# Patient Record
Sex: FEMALE | Race: WHITE | NOT HISPANIC OR LATINO | Employment: FULL TIME | URBAN - METROPOLITAN AREA
[De-identification: names, ages, dates, MRNs, and addresses within clinical notes are randomized per-mention and may not be internally consistent; named-entity substitution may affect disease eponyms.]

---

## 2017-07-31 ENCOUNTER — APPOINTMENT (OUTPATIENT)
Dept: URGENT CARE | Facility: MEDICAL CENTER | Age: 22
End: 2017-07-31

## 2017-07-31 ENCOUNTER — APPOINTMENT (OUTPATIENT)
Dept: LAB | Facility: MEDICAL CENTER | Age: 22
End: 2017-07-31

## 2017-07-31 ENCOUNTER — TRANSCRIBE ORDERS (OUTPATIENT)
Dept: URGENT CARE | Facility: MEDICAL CENTER | Age: 22
End: 2017-07-31

## 2017-07-31 DIAGNOSIS — Z00.8 SPECIAL EXAM: ICD-10-CM

## 2017-07-31 DIAGNOSIS — Z00.8 SPECIAL EXAM: Primary | ICD-10-CM

## 2017-07-31 PROCEDURE — 86480 TB TEST CELL IMMUN MEASURE: CPT

## 2017-07-31 PROCEDURE — 36415 COLL VENOUS BLD VENIPUNCTURE: CPT

## 2017-08-02 LAB
ANNOTATION COMMENT IMP: NORMAL
GAMMA INTERFERON BACKGROUND BLD IA-ACNC: 0 IU/ML
M TB IFN-G BLD-IMP: NEGATIVE
M TB IFN-G CD4+ BCKGRND COR BLD-ACNC: 0.03 IU/ML
M TB IFN-G CD4+ T-CELLS BLD-ACNC: 0.03 IU/ML
MITOGEN IGNF BLD-ACNC: >10 IU/ML
QUANTIFERON-TB GOLD IN TUBE: NORMAL
SERVICE CMNT-IMP: NORMAL

## 2017-08-15 DIAGNOSIS — Z13.220 ENCOUNTER FOR SCREENING FOR LIPOID DISORDERS: ICD-10-CM

## 2017-08-15 DIAGNOSIS — Z13.1 ENCOUNTER FOR SCREENING FOR DIABETES MELLITUS: ICD-10-CM

## 2017-08-28 ENCOUNTER — TRANSCRIBE ORDERS (OUTPATIENT)
Dept: LAB | Facility: CLINIC | Age: 22
End: 2017-08-28

## 2017-08-28 ENCOUNTER — APPOINTMENT (OUTPATIENT)
Dept: LAB | Facility: CLINIC | Age: 22
End: 2017-08-28
Payer: COMMERCIAL

## 2017-08-28 DIAGNOSIS — Z13.1 ENCOUNTER FOR SCREENING FOR DIABETES MELLITUS: ICD-10-CM

## 2017-08-28 DIAGNOSIS — Z13.220 ENCOUNTER FOR SCREENING FOR LIPOID DISORDERS: ICD-10-CM

## 2017-08-28 LAB
CHOLEST SERPL-MCNC: 132 MG/DL (ref 50–200)
EST. AVERAGE GLUCOSE BLD GHB EST-MCNC: 100 MG/DL
HBA1C MFR BLD: 5.1 % (ref 4.2–6.3)
HDLC SERPL-MCNC: 53 MG/DL (ref 40–60)
LDLC SERPL CALC-MCNC: 70 MG/DL (ref 0–100)
TRIGL SERPL-MCNC: 44 MG/DL

## 2017-08-28 PROCEDURE — 83036 HEMOGLOBIN GLYCOSYLATED A1C: CPT

## 2017-08-28 PROCEDURE — 36415 COLL VENOUS BLD VENIPUNCTURE: CPT

## 2017-08-28 PROCEDURE — 80061 LIPID PANEL: CPT

## 2017-10-27 ENCOUNTER — ALLSCRIPTS OFFICE VISIT (OUTPATIENT)
Dept: OTHER | Facility: OTHER | Age: 22
End: 2017-10-27

## 2017-10-27 DIAGNOSIS — E55.9 VITAMIN D DEFICIENCY: ICD-10-CM

## 2017-12-13 ENCOUNTER — TRANSCRIBE ORDERS (OUTPATIENT)
Dept: LAB | Facility: CLINIC | Age: 22
End: 2017-12-13

## 2017-12-13 ENCOUNTER — APPOINTMENT (OUTPATIENT)
Dept: LAB | Facility: CLINIC | Age: 22
End: 2017-12-13
Payer: COMMERCIAL

## 2017-12-13 DIAGNOSIS — E55.9 VITAMIN D DEFICIENCY: ICD-10-CM

## 2017-12-13 LAB — 25(OH)D3 SERPL-MCNC: 14.8 NG/ML (ref 30–100)

## 2017-12-13 PROCEDURE — 36415 COLL VENOUS BLD VENIPUNCTURE: CPT

## 2017-12-13 PROCEDURE — 82306 VITAMIN D 25 HYDROXY: CPT

## 2017-12-14 ENCOUNTER — GENERIC CONVERSION - ENCOUNTER (OUTPATIENT)
Dept: OTHER | Facility: OTHER | Age: 22
End: 2017-12-14

## 2018-01-18 NOTE — PROGRESS NOTES
Assessment    1  Encounter for preventive health examination (V70 0) (Z00 00)   2  Vitamin D deficiency (268 9) (E55 9)    Plan  Anxiety    · Propranolol HCl - 10 MG Oral Tablet; TAKE 1 TABLET Daily PRN social/perform  anxiety  Vitamin D deficiency    · (1) VITAMIN D 25-HYDROXY; Status:Active; Requested [facility]:[id number];     Discussion/Summary  health maintenance visit healthy adult female Currently, she eats an adequate diet and has an adequate exercise regimen  the risks and benefits of cervical cancer screening were discussed cervical cancer screening is current Breast cancer screening: breast cancer screening is not indicated  Colorectal cancer screening: colorectal cancer screening is not indicated  Osteoporosis screening: bone mineral density testing is not indicated  The immunizations are up to date  She was advised to be evaluated by an optometrist and a dentist  Advice and education were given regarding nutrition, aerobic exercise, weight bearing exercise, weight loss, calcium supplements, vitamin D supplements, reproductive health, sunscreen use, self skin examination and seat belt use  Patient discussion: discussed with the patient  The patient was counseled regarding diagnostic results, instructions for management, risk factor reductions, prognosis, patient and family education, impressions, risks and benefits of treatment options, importance of compliance with treatment  Possible side effects of new medications were reviewed with the patient/guardian today  The treatment plan was reviewed with the patient/guardian  The patient/guardian understands and agrees with the treatment plan      Chief Complaint  NPA here for annual physical exam      History of Present Illness  HM, Adult Female: The patient is being seen for a health maintenance evaluation  The last health maintenance visit was year(s) ago  Social History: Household members include mother and father  She is unmarried   Work status: working full time and occupation: PA  The patient has never smoked cigarettes  She reports rare alcohol use and drinking 1-2 drinks per week  She has never used illicit drugs  General Health: The patient's health since the last visit is described as good  She has regular dental visits  She denies vision problems  Vision care includes having regular eye examinations  Lifestyle:  She consumes a diverse and healthy diet  She exercises regularly  She exercises 3 or more times per week for 30 or more minutes per session  Exercise includes aerobic conditioning and strength training  Reproductive health: the patient is premenopausal   she is sexually active  GYN -- Dr Osorio Has  Screening: cancer screening reviewed and current  Cervical cancer screening includes a pap smear performed last year  Breast cancer screening includes no previous mammogram  She hasn't been previously screened for colorectal cancer  metabolic screening reviewed and current  risk screening reviewed and current  Review of Systems    Constitutional: No fever, no chills, feels well, no tiredness, no recent weight gain or weight loss  Eyes: No complaints of eye pain, no red eyes, no eyesight problems, no discharge, no dry eyes, no itching of eyes  ENT: no complaints of earache, no loss of hearing, no nose bleeds, no nasal discharge, no sore throat, no hoarseness  Cardiovascular: No complaints of slow heart rate, no fast heart rate, no chest pain, no palpitations, no leg claudication, no lower extremity edema  Respiratory: No complaints of shortness of breath, no wheezing, no cough, no SOB on exertion, no orthopnea, no PND  Gastrointestinal: No complaints of abdominal pain, no constipation, no nausea or vomiting, no diarrhea, no bloody stools  Genitourinary: No complaints of dysuria, no incontinence, no pelvic pain, no dysmenorrhea, no vaginal discharge or bleeding     Musculoskeletal: No complaints of arthralgias, no myalgias, no joint swelling or stiffness, no limb pain or swelling  Integumentary: No complaints of skin rash or lesions, no itching, no skin wounds, no breast pain or lump  Neurological: No complaints of headache, no confusion, no convulsions, no numbness, no dizziness or fainting, no tingling, no limb weakness, no difficulty walking  Psychiatric: Not suicidal, no sleep disturbance, no anxiety or depression, no change in personality, no emotional problems  Endocrine: No complaints of proptosis, no hot flashes, no muscle weakness, no deepening of the voice, no feelings of weakness  Hematologic/Lymphatic: No complaints of swollen glands, no swollen glands in the neck, does not bleed easily, does not bruise easily  Over the past 2 weeks, how often have you been bothered by the following problems? 1 ) Little interest or pleasure in doing things? Not at all    2 ) Feeling down, depressed or hopeless? Not at all  ROS reviewed  Active Problems    1  Anxiety (300 00) (F41 9)   2  Encounter for screening examination for impaired glucose regulation and diabetes   mellitus (V77 1) (Z13 1)   3  Palpitations (785 1) (R00 2)   4  Screening, lipid (V77 91) (Z13 220)    Surgical History    · History of Oral Surgery Tooth Extraction Lena Tooth    Family History  Family History    · Family history of colon cancer (V16 0) (Z80 0)   · Family history of coronary artery disease (V17 3) (Z82 49)   · Family history of hypertension (V17 49) (Z82 49)   · Family history of hypothyroidism (V18 19) (Z83 49)    Social History    · Never a smoker   · No illicit drug use   · Social alcohol use (Z78 9)    Current Meds   1  Multi-Day Vitamins TABS; Therapy: (Recorded:[id number]) to Recorded    Allergies    1  No Known Drug Allergies    2   Bee sting    Vitals   Recorded: [id number] 02:02PM   Temperature 97 9 F, Tympanic   Heart Rate 89   Systolic 257   Diastolic 72   Height 5 ft 5 in   Weight 179 lb 7 oz   BMI Calculated 29 86   BSA Calculated 1 89   O2 Saturation 98     Physical Exam    Constitutional   General appearance: No acute distress, well appearing and well nourished  Head and Face   Head and face: Normal     Palpation of the face and sinuses: No sinus tenderness  Eyes   Conjunctiva and lids: No swelling, erythema or discharge  Pupils and irises: Equal, round, reactive to light  Ears, Nose, Mouth, and Throat   External inspection of ears and nose: Normal     Otoscopic examination: Tympanic membranes translucent with normal light reflex  Canals patent without erythema  Hearing: Normal     Nasal mucosa, septum, and turbinates: Normal without edema or erythema  Lips, teeth, and gums: Normal, good dentition  Oropharynx: Normal with no erythema, edema, exudate or lesions  Neck   Neck: Supple, symmetric, trachea midline, no masses  Thyroid: Normal, no thyromegaly  Pulmonary   Respiratory effort: No increased work of breathing or signs of respiratory distress  Auscultation of lungs: Clear to auscultation  Cardiovascular   Auscultation of heart: Normal rate and rhythm, normal S1 and S2, no murmurs  Examination of extremities for edema and/or varicosities: Normal     Chest   Chest: Normal     Abdomen   Abdomen: Non-tender, no masses  Liver and spleen: No hepatomegaly or splenomegaly  Lymphatic   Palpation of lymph nodes in neck: No lymphadenopathy  Musculoskeletal   Gait and station: Normal     Digits and nails: Normal without clubbing or cyanosis  Joints, bones, and muscles: Normal     Range of motion: Normal     Stability: Normal     Muscle strength/tone: Normal     Skin   Skin and subcutaneous tissue: Normal without rashes or lesions  Palpation of skin and subcutaneous tissue: Normal turgor  Neurologic   Cranial nerves: Cranial nerves II-XII intact  Reflexes: 2+ and symmetric  Sensation: No sensory loss      Psychiatric   Orientation to person, place, and time: Normal     Mood and affect: Normal        Signatures   Electronically signed by : Willian Avila DO; Oct 27 2017  2:37PM EST                       (Author)

## 2018-01-22 VITALS
OXYGEN SATURATION: 98 % | SYSTOLIC BLOOD PRESSURE: 128 MMHG | BODY MASS INDEX: 29.9 KG/M2 | HEART RATE: 89 BPM | DIASTOLIC BLOOD PRESSURE: 72 MMHG | TEMPERATURE: 97.9 F | WEIGHT: 179.44 LBS | HEIGHT: 65 IN

## 2018-01-23 NOTE — RESULT NOTES
Discussion/Summary   vitamin D level very low -- sending rx to pharmacy (weekly x 12 wks) -- recheck in 4 months     Verified Results  (1) VITAMIN D 25-HYDROXY [id number] 07:58AM Lynda Stoddard Order Number: [id number]     Test Name Result Flag Reference   VIT D 25-HYDROX 14 8 ng/mL L 30 0-100 0   This assay is a certified procedure of the CDC Vitamin D Standardization Certification Program (VDSCP)     Deficiency <20ng/ml   Insufficiency 20-30ng/ml   Sufficient  ng/ml     *Patients undergoing fluorescein dye angiography may retain small amounts of fluorescein in the body for 48-72 hours post procedure  Samples containing fluorescein can produce falsely elevated Vitamin D values  If the patient had this procedure, a specimen should be resubmitted post fluorescein clearance         Plan  Vitamin D deficiency    · Vitamin D (Ergocalciferol) 88265 UNIT Oral Capsule; TAKE 1 CAPSULE WEEKLY

## 2018-02-23 ENCOUNTER — OFFICE VISIT (OUTPATIENT)
Dept: FAMILY MEDICINE CLINIC | Facility: OTHER | Age: 23
End: 2018-02-23
Payer: COMMERCIAL

## 2018-02-23 VITALS
DIASTOLIC BLOOD PRESSURE: 80 MMHG | SYSTOLIC BLOOD PRESSURE: 112 MMHG | HEIGHT: 66 IN | OXYGEN SATURATION: 98 % | WEIGHT: 181 LBS | HEART RATE: 81 BPM | BODY MASS INDEX: 29.09 KG/M2 | TEMPERATURE: 99.3 F

## 2018-02-23 DIAGNOSIS — E55.9 VITAMIN D DEFICIENCY: ICD-10-CM

## 2018-02-23 DIAGNOSIS — F41.9 ANXIETY: Primary | ICD-10-CM

## 2018-02-23 PROBLEM — R00.2 PALPITATIONS: Status: ACTIVE | Noted: 2017-10-27

## 2018-02-23 PROCEDURE — 99214 OFFICE O/P EST MOD 30 MIN: CPT | Performed by: FAMILY MEDICINE

## 2018-02-23 RX ORDER — MULTIVITAMIN
TABLET ORAL
COMMUNITY
End: 2022-02-07 | Stop reason: ALTCHOICE

## 2018-02-23 RX ORDER — PROPRANOLOL HYDROCHLORIDE 10 MG/1
TABLET ORAL DAILY
COMMUNITY
Start: 2017-10-27 | End: 2019-04-15

## 2018-02-23 RX ORDER — VENLAFAXINE HYDROCHLORIDE 37.5 MG/1
37.5 CAPSULE, EXTENDED RELEASE ORAL DAILY
Qty: 30 CAPSULE | Refills: 1 | Status: SHIPPED | OUTPATIENT
Start: 2018-02-23 | End: 2018-04-24 | Stop reason: SDUPTHER

## 2018-02-23 RX ORDER — ERGOCALCIFEROL 1.25 MG/1
CAPSULE ORAL
Refills: 0 | COMMUNITY
Start: 2017-12-14 | End: 2019-04-15

## 2018-02-23 NOTE — PATIENT INSTRUCTIONS
Anxiety   AMBULATORY CARE:   Anxiety  is a condition that causes you to feel extremely worried or nervous  The feelings are so strong that they can cause problems with your daily activities or sleep  Anxiety may be triggered by something you fear, or it may happen without a cause  Family or work stress, smoking, caffeine, and alcohol can increase your risk for anxiety  Certain medicines or health conditions can also increase your risk  Anxiety can become a long-term condition if it is not managed or treated  Common signs and symptoms that may occur with anxiety:   · Fatigue or muscle tightness     · Shaking, restlessness, or irritability     · Problems focusing     · Trouble sleeping     · Feeling jumpy, easily startled, or dizzy     · Rapid heartbeat or shortness of breath  Call 911 if:   · You have chest pain, tightness, or heaviness that may spread to your shoulders, arms, jaw, neck, or back  · You feel like hurting yourself or someone else  Contact your healthcare provider if:   · Your symptoms get worse or do not get better with treatment  · You think your medicine may be causing side effects  · Your anxiety keeps you from doing your regular daily activities  · You have new symptoms since your last visit  · You have questions or concerns about your condition or care  Treatment for anxiety  may include medicines to help you feel calm and relaxed, and decrease your symptoms  Medicines are usually given together with therapy or other treatments  Manage anxiety:   · Talk to someone about your anxiety  Your healthcare provider may suggest counseling  Cognitive behavioral therapy can help you understand and change how you react to events that trigger your symptoms  You might feel more comfortable talking with a friend or family member about your anxiety  Choose someone you know will be supportive and encouraging  · Find ways to relax    Activities such as exercise, meditation, or listening to music can help you relax  Spend time with friends, or do things you enjoy  · Practice deep breathing  Deep breathing can help you relax when you feel anxious  Focus on taking slow, deep breaths several times a day, or during an anxiety attack  Breathe in through your nose and out through your mouth  · Create a regular sleep routine  Regular sleep can help you feel calmer during the day  Go to sleep and wake up at the same times every day  Do not watch television or use the computer right before bed  Your room should be comfortable, dark, and quiet  · Eat a variety of healthy foods  Healthy foods include fruits, vegetables, low-fat dairy products, lean meats, fish, whole-grain breads, and cooked beans  Healthy foods can help you feel less anxious and have more energy  · Exercise regularly  Exercise can increase your energy level  Exercise may also lift your mood and help you sleep better  Your healthcare provider can help you create an exercise plan  · Do not smoke  Nicotine and other chemicals in cigarettes and cigars can increase anxiety  Ask your healthcare provider for information if you currently smoke and need help to quit  E-cigarettes or smokeless tobacco still contain nicotine  Talk to your healthcare provider before you use these products  · Do not have caffeine  Caffeine can make your symptoms worse  Do not have foods or drinks that are meant to increase your energy level  · Limit or do not drink alcohol  Ask your healthcare provider if alcohol is safe for you  You may not be able to drink alcohol if you take certain anxiety or depression medicines  Limit alcohol to 1 drink per day if you are a woman  Limit alcohol to 2 drinks per day if you are a man  A drink of alcohol is 12 ounces of beer, 5 ounces of wine, or 1½ ounces of liquor  · Do not use drugs  Drugs can make your anxiety worse  It can also make anxiety hard to manage   Talk to your healthcare provider if you use drugs and want help to quit  Follow up with your healthcare provider as directed:  Write down your questions so you remember to ask them during your visits  © 2017 [address] Information is for End User's use only and may not be sold, redistributed or otherwise used for commercial purposes  All illustrations and images included in CareNotes® are the copyrighted property of A [employer] M [employer]  or [address]  The above information is an  only  It is not intended as medical advice for individual conditions or treatments  Talk to your doctor, nurse or pharmacist before following any medical regimen to see if it is safe and effective for you

## 2018-02-23 NOTE — PROGRESS NOTES
Assessment/Plan:    Recommended counseling  balanced diet and exercise  Fu 2 months       Problem List Items Addressed This Visit     Anxiety - Primary    Relevant Medications    venlafaxine (EFFEXOR-XR) 37 5 mg 24 hr capsule    Vitamin D deficiency  Currently taking vit D weekly supplements  Will recheck vit d level after therapy  Recommended OTC after Rx is finished  Subjective:      Patient ID: Rosa Elena Sanchez is a 23 y o  female  Anxiety  Patient is here for evaluation of anxiety  She has the following anxiety symptoms: difficulty concentrating, feelings of losing control, irritable, palpitations, racing thoughts  Onset of symptoms was approximately several years ago  Symptoms have been gradually worsening since that time  She denies current suicidal and homicidal ideation  Family history significant for no psychiatric illness  Possible organic causes contributing are: none  Risk factors: none Previous treatment includes none  She complains of the following medication side effects: none  The following portions of the patient's history were reviewed and updated as appropriate: allergies, current medications, past family history, past medical history, past social history, past surgical history and problem list     Review of Systems   Constitutional: Negative for chills, fever and unexpected weight change  HENT: Negative for congestion  Respiratory: Negative for cough, chest tightness and shortness of breath  Cardiovascular: Negative for chest pain and leg swelling  Gastrointestinal: Negative for abdominal pain, nausea and vomiting  Genitourinary: Negative for dysuria  Musculoskeletal: Negative for arthralgias  Neurological: Positive for headaches  Negative for dizziness, weakness, light-headedness and numbness  Psychiatric/Behavioral: Positive for decreased concentration  Negative for behavioral problems, self-injury and suicidal ideas   The patient is nervous/anxious  Objective:      /80 (BP Location: Left arm, Patient Position: Sitting, Cuff Size: Adult)   Pulse 81   Temp 99 3 °F (37 4 °C) (Tympanic)   Ht 5' 5 5" (1 664 m)   Wt 82 1 kg (181 lb)   SpO2 98%   BMI 29 66 kg/m²          Physical Exam   Constitutional: She is oriented to person, place, and time  She appears well-nourished  HENT:   Head: Normocephalic and atraumatic  Eyes: Conjunctivae are normal  Right eye exhibits no discharge  Left eye exhibits no discharge  No scleral icterus  Neck: Neck supple  No thyromegaly present  Cardiovascular: Normal rate, regular rhythm and normal heart sounds  No murmur heard  Pulmonary/Chest: Effort normal and breath sounds normal  No respiratory distress  She has no wheezes  Lymphadenopathy:     She has no cervical adenopathy  Neurological: She is alert and oriented to person, place, and time  No cranial nerve deficit  Psychiatric: She has a normal mood and affect  Her behavior is normal  Judgment and thought content normal    Nursing note and vitals reviewed

## 2018-02-26 ENCOUNTER — OFFICE VISIT (OUTPATIENT)
Dept: OBGYN CLINIC | Facility: CLINIC | Age: 23
End: 2018-02-26
Payer: COMMERCIAL

## 2018-02-26 VITALS
RESPIRATION RATE: 16 BRPM | WEIGHT: 183.4 LBS | DIASTOLIC BLOOD PRESSURE: 70 MMHG | HEIGHT: 64 IN | SYSTOLIC BLOOD PRESSURE: 118 MMHG | HEART RATE: 80 BPM | BODY MASS INDEX: 31.31 KG/M2

## 2018-02-26 DIAGNOSIS — Z01.419 WELL WOMAN EXAM WITH ROUTINE GYNECOLOGICAL EXAM: Primary | ICD-10-CM

## 2018-02-26 PROCEDURE — 99385 PREV VISIT NEW AGE 18-39: CPT | Performed by: OBSTETRICS & GYNECOLOGY

## 2018-02-26 PROCEDURE — G0145 SCR C/V CYTO,THINLAYER,RESCR: HCPCS | Performed by: OBSTETRICS & GYNECOLOGY

## 2018-02-26 NOTE — PROGRESS NOTES
ASSESSMENT & PLAN: Vivienne Cruz is a 23 y o  Jessica Brine with normal gynecologic exam     1   Routine well woman exam done today  2    Pap and HPV:Pap with reflex HPV was not done today  Current ASCCP Guidelines reviewed  Last Pap  2016 :  no abnormalities  3   The patient declined STD testing  No testing performed  Safe sex practices have been discussed  4   Gardasil is recommended for patients from 526 years of age  The patient has had the Gardasil vaccine series  5  The patient is sexually active  She declined contraception and options have been discussed  6  The following were reviewed in today's visit: breast self exam, use and side effects of OCPs, family planning choices, exercise and healthy diet  7  Patient to return to office in 12 months for annual exam      All questions have been answered to her satisfaction  CC:  Annual Gynecologic Examination    HPI: Vivienne Cruz is a 23 y o  Jessica Brine who presents for annual gynecologic examination  She has the following concerns:  H/o abnormal pap x 1, repeat paps were normal  She had a cyst on her cervix in the past, thinks it's still there  Health Maintenance:    She exercises 4 days per week  She wears her seatbelt routinely  She does perform regular monthly self breast exams  She feels safe at home  Patients does follow an ok diet  Past Medical History:   Diagnosis Date    Anxiety        Past Surgical History:   Procedure Laterality Date    WISDOM TOOTH EXTRACTION         Past OB/Gyn History:   Patient's last menstrual period was 2018 (exact date)  Menstrual History:  OB History      Para Term  AB Living    0 0 0 0 0 0    SAB TAB Ectopic Multiple Live Births    0 0 0 0 0         Menarche age: 13   Patient's last menstrual period was 2018 (exact date)  History of sexually transmitted infection No  Patient is currently sexually active  heterosexual Birth control: condoms    Last Pap 10/2016 :  no abnormalities  Family History   Problem Relation Age of Onset    Hypertension Mother     Hypothyroidism Mother     Colon cancer Maternal Grandfather     Colon cancer Paternal Grandmother        Social History:  Social History     Social History    Marital status: Single     Spouse name: N/A    Number of children: N/A    Years of education: N/A     Occupational History    Not on file  Social History Main Topics    Smoking status: Never Smoker    Smokeless tobacco: Never Used    Alcohol use Yes      Comment: social    Drug use: No    Sexual activity: Yes     Partners: Male     Birth control/ protection: Condom     Other Topics Concern    Not on file     Social History Narrative    No narrative on file     Presently lives with parents  Patient is enaged  Patient is currently employed - PA at [employer]  Allergies   Allergen Reactions    Bee Venom        Current Outpatient Prescriptions:     ergocalciferol (VITAMIN D2) 50,000 units, TK 1 C PO Q WEEK, Disp: , Rfl: 0    Multiple Vitamin (MULTI-DAY VITAMINS) TABS, Take by mouth, Disp: , Rfl:     propranolol (INDERAL) 10 mg tablet, Take by mouth Daily, Disp: , Rfl:     venlafaxine (EFFEXOR-XR) 37 5 mg 24 hr capsule, Take 1 capsule (37 5 mg total) by mouth daily, Disp: 30 capsule, Rfl: 1    Review of Systems:  A complete review of systems was performed and was negative, except as listed  Denies weight changes, excessive fatigue, urinary incontinence, urinary frequency, constipation or bowel changes, blood in stool, severe headaches, vaginal bleeding, vaginal discharge  + palpitations/+ dymenorrhea/+ dizziness     Physical Exam:  /70 (Patient Position: Sitting, Cuff Size: Standard)   Pulse 80   Resp 16   Ht 5' 4 25" (1 632 m)   Wt 83 2 kg (183 lb 6 4 oz)   LMP 02/14/2018 (Exact Date)   BMI 31 24 kg/m²    GEN: The patient was alert and oriented x3, pleasant well-appearing female in no acute distress     HEENT: Unremarkable, no anterior or posterior lymphadenopathy, no thyromegaly  CV:  RRR, no murmurs  RESP:  Clear to auscultation bilaterally  BREAST:  Symmetric breasts with no palpable breast masses or obvious breast lesions  She has no retractions or nipple discharge  She has no axillary abnormalities or palpable masses  Self breast exam is taught  ABD:  Soft, nontender, non-distended  EXT: nontender, no edema  PELVIC:  Normal appearing external female genitalia, normal vaginal epithelium, No discharge  Cervix present   Bimanual: absent CMT,  normal uterus, non-tender  No palpable adnexal masses  Pap was collected

## 2018-03-05 LAB
LAB AP GYN PRIMARY INTERPRETATION: NORMAL
Lab: NORMAL

## 2018-04-03 ENCOUNTER — OFFICE VISIT (OUTPATIENT)
Dept: FAMILY MEDICINE CLINIC | Facility: OTHER | Age: 23
End: 2018-04-03
Payer: COMMERCIAL

## 2018-04-03 ENCOUNTER — APPOINTMENT (OUTPATIENT)
Dept: LAB | Facility: CLINIC | Age: 23
End: 2018-04-03
Payer: COMMERCIAL

## 2018-04-03 ENCOUNTER — TRANSCRIBE ORDERS (OUTPATIENT)
Dept: LAB | Facility: CLINIC | Age: 23
End: 2018-04-03

## 2018-04-03 VITALS
HEIGHT: 68 IN | WEIGHT: 183.9 LBS | HEART RATE: 83 BPM | OXYGEN SATURATION: 98 % | BODY MASS INDEX: 27.87 KG/M2 | TEMPERATURE: 99.1 F | SYSTOLIC BLOOD PRESSURE: 118 MMHG | DIASTOLIC BLOOD PRESSURE: 78 MMHG

## 2018-04-03 DIAGNOSIS — J06.9 VIRAL UPPER RESPIRATORY TRACT INFECTION: ICD-10-CM

## 2018-04-03 DIAGNOSIS — R59.0 CERVICAL ADENOPATHY: ICD-10-CM

## 2018-04-03 DIAGNOSIS — J02.9 SORE THROAT: Primary | ICD-10-CM

## 2018-04-03 DIAGNOSIS — E55.9 VITAMIN D DEFICIENCY: ICD-10-CM

## 2018-04-03 DIAGNOSIS — J02.9 SORE THROAT: ICD-10-CM

## 2018-04-03 LAB
25(OH)D3 SERPL-MCNC: 37.8 NG/ML (ref 30–100)
BASOPHILS # BLD AUTO: 0.03 THOUSANDS/ΜL (ref 0–0.1)
BASOPHILS NFR BLD AUTO: 0 % (ref 0–1)
EOSINOPHIL # BLD AUTO: 0.05 THOUSAND/ΜL (ref 0–0.61)
EOSINOPHIL NFR BLD AUTO: 1 % (ref 0–6)
ERYTHROCYTE [DISTWIDTH] IN BLOOD BY AUTOMATED COUNT: 13.3 % (ref 11.6–15.1)
ERYTHROCYTE [SEDIMENTATION RATE] IN BLOOD: 8 MM/HOUR (ref 0–20)
HCT VFR BLD AUTO: 39.5 % (ref 34.8–46.1)
HGB BLD-MCNC: 13.2 G/DL (ref 11.5–15.4)
LYMPHOCYTES # BLD AUTO: 2.47 THOUSANDS/ΜL (ref 0.6–4.47)
LYMPHOCYTES NFR BLD AUTO: 28 % (ref 14–44)
MCH RBC QN AUTO: 28.9 PG (ref 26.8–34.3)
MCHC RBC AUTO-ENTMCNC: 33.4 G/DL (ref 31.4–37.4)
MCV RBC AUTO: 87 FL (ref 82–98)
MONOCYTES # BLD AUTO: 0.68 THOUSAND/ΜL (ref 0.17–1.22)
MONOCYTES NFR BLD AUTO: 8 % (ref 4–12)
NEUTROPHILS # BLD AUTO: 5.74 THOUSANDS/ΜL (ref 1.85–7.62)
NEUTS SEG NFR BLD AUTO: 63 % (ref 43–75)
PLATELET # BLD AUTO: 172 THOUSANDS/UL (ref 149–390)
PMV BLD AUTO: 13 FL (ref 8.9–12.7)
RBC # BLD AUTO: 4.56 MILLION/UL (ref 3.81–5.12)
S PYO AG THROAT QL: NEGATIVE
WBC # BLD AUTO: 8.97 THOUSAND/UL (ref 4.31–10.16)

## 2018-04-03 PROCEDURE — 86663 EPSTEIN-BARR ANTIBODY: CPT

## 2018-04-03 PROCEDURE — 87880 STREP A ASSAY W/OPTIC: CPT | Performed by: NURSE PRACTITIONER

## 2018-04-03 PROCEDURE — 86665 EPSTEIN-BARR CAPSID VCA: CPT

## 2018-04-03 PROCEDURE — 99214 OFFICE O/P EST MOD 30 MIN: CPT | Performed by: NURSE PRACTITIONER

## 2018-04-03 PROCEDURE — 3008F BODY MASS INDEX DOCD: CPT | Performed by: NURSE PRACTITIONER

## 2018-04-03 PROCEDURE — 85652 RBC SED RATE AUTOMATED: CPT

## 2018-04-03 PROCEDURE — 86664 EPSTEIN-BARR NUCLEAR ANTIGEN: CPT

## 2018-04-03 PROCEDURE — 85025 COMPLETE CBC W/AUTO DIFF WBC: CPT

## 2018-04-03 PROCEDURE — 36415 COLL VENOUS BLD VENIPUNCTURE: CPT

## 2018-04-03 PROCEDURE — 82306 VITAMIN D 25 HYDROXY: CPT

## 2018-04-03 NOTE — PATIENT INSTRUCTIONS
Upper Respiratory Infection, Ambulatory Care   GENERAL INFORMATION:   An upper respiratory infection  is also called a common cold  It can affect your nose, throat, ears, and sinuses  Common symptoms include the following:   · Runny or stuffy nose    · Sneezing and coughing    · Sore throat or hoarseness    · Red, watery, and sore eyes    · Tiredness or restlessness    · Chills and fever    · Headache, body aches, or sore muscles  Seek immediate care for the following symptoms:   · Headaches or a stiff neck    · Bright lights hurt your eyes    · Chest pain or trouble breathing  Treatment for an upper respiratory infection  may include any of the following:  · Decongestants  help decrease nasal congestion and improve your breathing  Do not use decongestant sprays for more than a few days  · Cough suppressants  help decrease coughing  Ask your healthcare provider which type of cough medicine is best for you  Some cough medicines need a doctor's order  · NSAIDs  help decrease swelling and pain or fever  This medicine is available with or without a doctor's order  NSAIDs can cause stomach bleeding or kidney problems in certain people  If you take blood thinner medicine, always ask your healthcare provider if NSAIDs are safe for you  Always read the medicine label and follow directions  Care for an upper respiratory infection:   · Rest  until your fever is gone or you feel better  · Drink liquids as directed to prevent dehydration  You may need to drink 8 to 10 cups of liquid each day  Good liquids to drink include water, ginger ale, tea, or fruit juices  · Gargle  with warm salt water to help your sore throat feel better  Mix ¼ teaspoon salt with 1 cup warm water  You may also suck on hard candy or throat lozenges  · Saline nasal drops  help loosen your nasal congestion  They can be bought without a doctor's order      · Take a warm bath or shower  to help decrease body aches and help you breathe easier  · Use a cool-mist humidifier  to increase air moisture and make it easier for you to breathe  Prevent the spread of germs:   · Avoid others for the first 2 to 3 days of your cold  Germs are easily spread during this time  · Do not share food, drinks,  towels, or personal items with others  · Wash your hands often  Use soap and water  Wash your hands after you use the bathroom, change a child's diapers, or sneeze  Wash your hands before you prepare or eat food  Cover your mouth and nose with a tissue when you sneeze or cough  Follow up with your healthcare provider as directed:  Write down your questions so you remember to ask them during your visits  CARE AGREEMENT:   You have the right to help plan your care  Learn about your health condition and how it may be treated  Discuss treatment options with your caregivers to decide what care you want to receive  You always have the right to refuse treatment  The above information is an  only  It is not intended as medical advice for individual conditions or treatments  Talk to your doctor, nurse or pharmacist before following any medical regimen to see if it is safe and effective for you  © 2014 [address] is for End User's use only and may not be sold, redistributed or otherwise used for commercial purposes  All illustrations and images included in CareNotes® are the copyrighted property of [employer]  or Hank Sawyer

## 2018-04-03 NOTE — PROGRESS NOTES
Assessment/Plan:       Diagnoses and all orders for this visit:    Sore throat + Viral upper respiratory tract infection  --Rapid strep-->negative  Likely viral etiology  Can check for mono (per labs below) if ongoing fatigue, sore throat over the next week  --Advise rest, fluids, gargles, lozenges, Motrin, OTC throat spray  Cervical adenopathy (mildly palpable discrete nodes x 2, first noticed 6 months ago)  -     US head neck soft tissue; Future  -     CBC and differential; Future  -     EBV acute panel; Future  -     Sedimentation rate, automated; Future  -     ENT follow-up if needed, pending results        Subjective:      Patient ID: Jonnathan Perez is a 23 y o  female  Here with complaints of URI symptoms x 4 days  Sore throat, mild nasal congestion, cough  Low grade fever (101)  No significant headaches, body aches  Somewhat fatigued  No rash  Appetite fair  No N/V/D, abdominal pain  Taking Tylenol, salt water gargles  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[employer])  Numerous potential sick contacts  Had flu shot  Anxiety improved with Effexor, but she remains tired  Denies feeling down depressed  Taking vitamin D supplement  Persistent, small, mildly tender lymph nodes, one on either side of her neck x 6 months  Would like to get ultrasound done as a precaution  No swollen glands elsewhere  Sore Throat    Associated symptoms include coughing and headaches  Pertinent negatives include no abdominal pain or vomiting  The following portions of the patient's history were reviewed and updated as appropriate: allergies, current medications, past family history, past medical history, past social history, past surgical history and problem list     Review of Systems   Constitutional: Positive for fever  HENT: Positive for sore throat  Respiratory: Positive for cough  Gastrointestinal: Negative for abdominal pain, nausea and vomiting     Musculoskeletal: Negative for myalgias  Neurological: Positive for headaches  Hematological: Positive for adenopathy  Psychiatric/Behavioral:        Per HPI         Objective:      /78 (BP Location: Left arm, Patient Position: Sitting, Cuff Size: Adult)   Pulse 83   Temp 99 1 °F (37 3 °C) (Tympanic)   Ht 5' 8" (1 727 m)   Wt 83 4 kg (183 lb 14 4 oz)   SpO2 98%   BMI 27 96 kg/m²          Physical Exam   Constitutional: She is oriented to person, place, and time  She appears well-developed and well-nourished  HENT:   Head: Normocephalic  Right Ear: External ear normal    Left Ear: External ear normal    Turbinates mildly swollen, erythematous  No sinus tenderness  Tonsils 1+, erythematous, without exudate  Eyes: Conjunctivae are normal  Pupils are equal, round, and reactive to light  Neck: Normal range of motion  Neck supple  Small (5 mm), minimally palpable, mildly tender, mobile nodes noted right anterior cervical chain (single node) and left posterior cervical chain (single node)  No overlying skin changes  Cardiovascular: Normal rate, regular rhythm and normal heart sounds  Pulmonary/Chest: Effort normal and breath sounds normal    Abdominal: Soft  Bowel sounds are normal  There is no tenderness  Lymphadenopathy:     She has cervical adenopathy  Neurological: She is alert and oriented to person, place, and time  She has normal reflexes  Skin: Skin is warm and dry  Psychiatric: She has a normal mood and affect

## 2018-04-04 ENCOUNTER — HOSPITAL ENCOUNTER (OUTPATIENT)
Dept: ULTRASOUND IMAGING | Facility: HOSPITAL | Age: 23
Discharge: HOME | End: 2018-04-04
Payer: COMMERCIAL

## 2018-04-04 DIAGNOSIS — R59.0 CERVICAL ADENOPATHY: ICD-10-CM

## 2018-04-04 LAB
EBV EA IGG SER-ACNC: >150 U/ML (ref 0–8.9)
EBV NA IGG SER IA-ACNC: 52.3 U/ML (ref 0–17.9)
EBV PATRN SPEC IB-IMP: ABNORMAL
EBV VCA IGG SER IA-ACNC: >600 U/ML (ref 0–17.9)
EBV VCA IGM SER IA-ACNC: <36 U/ML (ref 0–35.9)

## 2018-04-04 PROCEDURE — 76536 US EXAM OF HEAD AND NECK: CPT

## 2018-04-24 DIAGNOSIS — F41.9 ANXIETY: ICD-10-CM

## 2018-04-24 RX ORDER — VENLAFAXINE HYDROCHLORIDE 37.5 MG/1
37.5 CAPSULE, EXTENDED RELEASE ORAL DAILY
Qty: 90 CAPSULE | Refills: 3 | Status: SHIPPED | OUTPATIENT
Start: 2018-04-24 | End: 2019-04-15

## 2018-06-20 ENCOUNTER — APPOINTMENT (OUTPATIENT)
Dept: LAB | Facility: CLINIC | Age: 23
End: 2018-06-20

## 2018-06-20 ENCOUNTER — TRANSCRIBE ORDERS (OUTPATIENT)
Dept: LAB | Facility: CLINIC | Age: 23
End: 2018-06-20

## 2018-06-20 DIAGNOSIS — Z00.8 HEALTH EXAMINATION IN POPULATION SURVEY: Primary | ICD-10-CM

## 2018-06-20 DIAGNOSIS — Z00.8 HEALTH EXAMINATION IN POPULATION SURVEY: ICD-10-CM

## 2018-06-20 LAB
CHOLEST SERPL-MCNC: 147 MG/DL (ref 50–200)
EST. AVERAGE GLUCOSE BLD GHB EST-MCNC: 97 MG/DL
HBA1C MFR BLD: 5 % (ref 4.2–6.3)
HDLC SERPL-MCNC: 56 MG/DL (ref 40–60)
LDLC SERPL CALC-MCNC: 80 MG/DL (ref 0–100)
NONHDLC SERPL-MCNC: 91 MG/DL
TRIGL SERPL-MCNC: 55 MG/DL

## 2018-06-20 PROCEDURE — 80061 LIPID PANEL: CPT

## 2018-06-20 PROCEDURE — 36415 COLL VENOUS BLD VENIPUNCTURE: CPT

## 2018-06-20 PROCEDURE — 83036 HEMOGLOBIN GLYCOSYLATED A1C: CPT

## 2019-04-15 ENCOUNTER — ANNUAL EXAM (OUTPATIENT)
Dept: OBGYN CLINIC | Facility: CLINIC | Age: 24
End: 2019-04-15
Payer: COMMERCIAL

## 2019-04-15 VITALS
SYSTOLIC BLOOD PRESSURE: 112 MMHG | BODY MASS INDEX: 30.53 KG/M2 | DIASTOLIC BLOOD PRESSURE: 74 MMHG | HEIGHT: 66 IN | WEIGHT: 190 LBS

## 2019-04-15 DIAGNOSIS — Z12.4 ENCOUNTER FOR SCREENING FOR MALIGNANT NEOPLASM OF CERVIX: ICD-10-CM

## 2019-04-15 DIAGNOSIS — Z01.419 WELL FEMALE EXAM WITH ROUTINE GYNECOLOGICAL EXAM: Primary | ICD-10-CM

## 2019-04-15 PROCEDURE — 99395 PREV VISIT EST AGE 18-39: CPT | Performed by: OBSTETRICS & GYNECOLOGY

## 2019-06-25 ENCOUNTER — OFFICE VISIT (OUTPATIENT)
Dept: FAMILY MEDICINE CLINIC | Facility: OTHER | Age: 24
End: 2019-06-25
Payer: COMMERCIAL

## 2019-06-25 VITALS
TEMPERATURE: 99 F | BODY MASS INDEX: 33.54 KG/M2 | DIASTOLIC BLOOD PRESSURE: 68 MMHG | WEIGHT: 201.3 LBS | HEART RATE: 75 BPM | SYSTOLIC BLOOD PRESSURE: 108 MMHG | OXYGEN SATURATION: 96 % | HEIGHT: 65 IN

## 2019-06-25 DIAGNOSIS — Z13.6 SCREENING FOR CARDIOVASCULAR CONDITION: ICD-10-CM

## 2019-06-25 DIAGNOSIS — E55.9 VITAMIN D DEFICIENCY: ICD-10-CM

## 2019-06-25 DIAGNOSIS — Z13.1 SCREENING FOR DIABETES MELLITUS: ICD-10-CM

## 2019-06-25 DIAGNOSIS — N92.0 MENORRHAGIA WITH REGULAR CYCLE: ICD-10-CM

## 2019-06-25 DIAGNOSIS — F41.9 ANXIETY: ICD-10-CM

## 2019-06-25 DIAGNOSIS — Z00.00 ANNUAL PHYSICAL EXAM: Primary | ICD-10-CM

## 2019-06-25 DIAGNOSIS — R23.8 EASY BRUISING: ICD-10-CM

## 2019-06-25 PROCEDURE — 99395 PREV VISIT EST AGE 18-39: CPT | Performed by: FAMILY MEDICINE

## 2019-06-25 RX ORDER — PROPRANOLOL HYDROCHLORIDE 10 MG/1
10 TABLET ORAL 3 TIMES DAILY
Qty: 30 TABLET | Refills: 1 | Status: SHIPPED | OUTPATIENT
Start: 2019-06-25 | End: 2020-07-28

## 2019-06-27 ENCOUNTER — APPOINTMENT (OUTPATIENT)
Dept: LAB | Facility: CLINIC | Age: 24
End: 2019-06-27
Payer: COMMERCIAL

## 2019-06-27 DIAGNOSIS — Z13.1 SCREENING FOR DIABETES MELLITUS: ICD-10-CM

## 2019-06-27 DIAGNOSIS — Z13.6 SCREENING FOR CARDIOVASCULAR CONDITION: ICD-10-CM

## 2019-06-27 DIAGNOSIS — E55.9 VITAMIN D DEFICIENCY: ICD-10-CM

## 2019-06-27 DIAGNOSIS — R23.8 EASY BRUISING: ICD-10-CM

## 2019-06-27 DIAGNOSIS — N92.0 MENORRHAGIA WITH REGULAR CYCLE: ICD-10-CM

## 2019-06-27 DIAGNOSIS — F41.9 ANXIETY: ICD-10-CM

## 2019-06-27 DIAGNOSIS — E55.9 VITAMIN D DEFICIENCY: Primary | ICD-10-CM

## 2019-06-27 LAB
25(OH)D3 SERPL-MCNC: 19 NG/ML (ref 30–100)
ALBUMIN SERPL BCP-MCNC: 4.1 G/DL (ref 3.5–5)
ALP SERPL-CCNC: 79 U/L (ref 46–116)
ALT SERPL W P-5'-P-CCNC: 40 U/L (ref 12–78)
ANION GAP SERPL CALCULATED.3IONS-SCNC: 5 MMOL/L (ref 4–13)
AST SERPL W P-5'-P-CCNC: 14 U/L (ref 5–45)
BILIRUB SERPL-MCNC: 0.4 MG/DL (ref 0.2–1)
BUN SERPL-MCNC: 14 MG/DL (ref 5–25)
CALCIUM SERPL-MCNC: 9.4 MG/DL (ref 8.3–10.1)
CHLORIDE SERPL-SCNC: 105 MMOL/L (ref 100–108)
CHOLEST SERPL-MCNC: 161 MG/DL (ref 50–200)
CO2 SERPL-SCNC: 27 MMOL/L (ref 21–32)
CREAT SERPL-MCNC: 0.74 MG/DL (ref 0.6–1.3)
ERYTHROCYTE [DISTWIDTH] IN BLOOD BY AUTOMATED COUNT: 12.9 % (ref 11.6–15.1)
GFR SERPL CREATININE-BSD FRML MDRD: 112 ML/MIN/1.73SQ M
GLUCOSE P FAST SERPL-MCNC: 92 MG/DL (ref 65–99)
HCT VFR BLD AUTO: 39.1 % (ref 34.8–46.1)
HDLC SERPL-MCNC: 50 MG/DL (ref 40–60)
HGB BLD-MCNC: 12.7 G/DL (ref 11.5–15.4)
IRON SERPL-MCNC: 83 UG/DL (ref 50–170)
LDLC SERPL CALC-MCNC: 100 MG/DL (ref 0–100)
MCH RBC QN AUTO: 28.9 PG (ref 26.8–34.3)
MCHC RBC AUTO-ENTMCNC: 32.5 G/DL (ref 31.4–37.4)
MCV RBC AUTO: 89 FL (ref 82–98)
NONHDLC SERPL-MCNC: 111 MG/DL
PLATELET # BLD AUTO: 163 THOUSANDS/UL (ref 149–390)
PMV BLD AUTO: 13.7 FL (ref 8.9–12.7)
POTASSIUM SERPL-SCNC: 4.1 MMOL/L (ref 3.5–5.3)
PROT SERPL-MCNC: 8.1 G/DL (ref 6.4–8.2)
RBC # BLD AUTO: 4.4 MILLION/UL (ref 3.81–5.12)
SODIUM SERPL-SCNC: 137 MMOL/L (ref 136–145)
TRIGL SERPL-MCNC: 56 MG/DL
TSH SERPL DL<=0.05 MIU/L-ACNC: 1.86 UIU/ML (ref 0.36–3.74)
WBC # BLD AUTO: 7.26 THOUSAND/UL (ref 4.31–10.16)

## 2019-06-27 PROCEDURE — 80061 LIPID PANEL: CPT

## 2019-06-27 PROCEDURE — 36415 COLL VENOUS BLD VENIPUNCTURE: CPT

## 2019-06-27 PROCEDURE — 85027 COMPLETE CBC AUTOMATED: CPT

## 2019-06-27 PROCEDURE — 84443 ASSAY THYROID STIM HORMONE: CPT

## 2019-06-27 PROCEDURE — 82306 VITAMIN D 25 HYDROXY: CPT

## 2019-06-27 PROCEDURE — 83540 ASSAY OF IRON: CPT

## 2019-06-27 PROCEDURE — 80053 COMPREHEN METABOLIC PANEL: CPT

## 2019-06-27 RX ORDER — ERGOCALCIFEROL 1.25 MG/1
50000 CAPSULE ORAL WEEKLY
Qty: 12 CAPSULE | Refills: 0 | Status: SHIPPED | OUTPATIENT
Start: 2019-06-27 | End: 2019-07-03 | Stop reason: SDUPTHER

## 2019-07-03 DIAGNOSIS — E55.9 VITAMIN D DEFICIENCY: ICD-10-CM

## 2019-07-03 RX ORDER — ERGOCALCIFEROL 1.25 MG/1
50000 CAPSULE ORAL WEEKLY
Qty: 12 CAPSULE | Refills: 0 | Status: SHIPPED | OUTPATIENT
Start: 2019-07-03 | End: 2020-10-06

## 2019-08-21 DIAGNOSIS — M79.89 PAIN AND SWELLING OF RIGHT LOWER LEG: Primary | ICD-10-CM

## 2019-08-21 DIAGNOSIS — M79.661 PAIN AND SWELLING OF RIGHT LOWER LEG: Primary | ICD-10-CM

## 2019-08-22 ENCOUNTER — HOSPITAL ENCOUNTER (OUTPATIENT)
Dept: NON INVASIVE DIAGNOSTICS | Facility: HOSPITAL | Age: 24
Discharge: HOME | End: 2019-08-22
Payer: COMMERCIAL

## 2019-08-22 DIAGNOSIS — M79.661 PAIN AND SWELLING OF RIGHT LOWER LEG: ICD-10-CM

## 2019-08-22 DIAGNOSIS — M79.89 PAIN AND SWELLING OF RIGHT LOWER LEG: ICD-10-CM

## 2019-08-22 PROCEDURE — 93971 EXTREMITY STUDY: CPT

## 2019-08-22 PROCEDURE — 93971 EXTREMITY STUDY: CPT | Performed by: SURGERY

## 2020-03-27 ENCOUNTER — DOCUMENTATION (OUTPATIENT)
Dept: URGENT CARE | Age: 25
End: 2020-03-27

## 2020-03-27 DIAGNOSIS — Z20.828 EXPOSURE TO SARS-ASSOCIATED CORONAVIRUS: Primary | ICD-10-CM

## 2020-03-30 ENCOUNTER — TELEPHONE (OUTPATIENT)
Dept: URGENT CARE | Age: 25
End: 2020-03-30

## 2020-03-30 NOTE — TELEPHONE ENCOUNTER
Left message for patient to call [facility]  When patient calls, please inform her of negative COVID-19 results

## 2020-03-31 ENCOUNTER — TELEMEDICINE (OUTPATIENT)
Dept: FAMILY MEDICINE CLINIC | Facility: OTHER | Age: 25
End: 2020-03-31
Payer: COMMERCIAL

## 2020-03-31 DIAGNOSIS — F43.0 ANXIETY AS ACUTE REACTION TO EXCEPTIONAL STRESS: ICD-10-CM

## 2020-03-31 DIAGNOSIS — F41.1 ANXIETY AS ACUTE REACTION TO EXCEPTIONAL STRESS: ICD-10-CM

## 2020-03-31 DIAGNOSIS — R00.2 PALPITATIONS: ICD-10-CM

## 2020-03-31 DIAGNOSIS — R50.9 FEVER, UNSPECIFIED FEVER CAUSE: ICD-10-CM

## 2020-03-31 DIAGNOSIS — R53.83 FATIGUE, UNSPECIFIED TYPE: Primary | ICD-10-CM

## 2020-03-31 PROCEDURE — 99213 OFFICE O/P EST LOW 20 MIN: CPT | Performed by: FAMILY MEDICINE

## 2020-03-31 RX ORDER — LORAZEPAM 0.5 MG/1
0.5 TABLET ORAL DAILY PRN
Qty: 30 TABLET | Refills: 0 | Status: SHIPPED | OUTPATIENT
Start: 2020-03-31 | End: 2020-10-06 | Stop reason: SDUPTHER

## 2020-03-31 NOTE — PROGRESS NOTES
Virtual Regular Visit    Problem List Items Addressed This Visit        Other    Palpitations    Relevant Orders    CBC and differential    Basic metabolic panel    TSH, 3rd generation with Free T4 reflex      Other Visit Diagnoses     Fatigue, unspecified type    -  Primary    Relevant Orders    CBC and differential    Basic metabolic panel    TSH, 3rd generation with Free T4 reflex    Fever, unspecified fever cause        Relevant Orders    CBC and differential    Basic metabolic panel    TSH, 3rd generation with Free T4 reflex    Anxiety as acute reaction to exceptional stress        Relevant Medications    LORazepam (ATIVAN) 0 5 mg tablet        29 yo female with above constellation of sx  Will check labs to r/o underlying etiology for sx  Presentation also highly suspicious for acute anxiety reaction -- recommend trial of PRN lorazepam   PDMP consulted and appropriate -- no red flags  Follow up pending results  Return if symptoms worsen or fail to improve  The patient indicates understanding of these issues and agrees with the plan  Reason for visit is TIRED/FEVER  Encounter provider Ronen Conteh DO    Provider located at [address] [place] [address]      Recent Visits  No visits were found meeting these conditions  Showing recent visits within past 7 days and meeting all other requirements     Today's Visits  Date Type Provider Dept   03/31/20 Telemedicine DO Jose Antonio Vazquez   Showing today's visits and meeting all other requirements     Future Appointments  Date Type Provider Dept   03/31/20 Telemedicine DO Jose Antonio Vazquez   Showing future appointments within next 150 days and meeting all other requirements        The patient was identified by name and date of birth   Rickie Hagan was informed that this is a telemedicine visit and that the visit is being conducted through [facility] and patient was informed that this is not a secure, HIPAA-complaint platform  she agrees to proceed     My office door was closed  No one else was in the room  She acknowledged consent and understanding of privacy and security of the video platform  The patient has agreed to participate and understands they can discontinue the visit at any time  Patient is aware this is a billable service  Subjective  Ronel Ramirez is a 30 y o  female   Pt presents c/o tired and fever  Last week felt flushed, tired, feverish  Tmax was 100 1   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   +HA, palpitations, nausea      Past Medical History:   Diagnosis Date    Anxiety        Past Surgical History:   Procedure Laterality Date    WISDOM TOOTH EXTRACTION         Current Outpatient Medications   Medication Sig Dispense Refill    ergocalciferol (VITAMIN D2) 50,000 units Take 1 capsule (50,000 Units total) by mouth once a week for 78 days 12 capsule 0    LORazepam (ATIVAN) 0 5 mg tablet Take 1 tablet (0 5 mg total) by mouth daily as needed for anxiety 30 tablet 0    Multiple Vitamin (MULTI-DAY VITAMINS) TABS Take by mouth      propranolol (INDERAL) 10 mg tablet Take 1 tablet (10 mg total) by mouth 3 (three) times a day 30 tablet 1     No current facility-administered medications for this visit  Allergies   Allergen Reactions    Bee Venom        Review of Systems   Constitutional: Positive for fatigue and fever  Negative for activity change  HENT: Negative for congestion, ear pain, sinus pain and sore throat  Eyes: Negative for pain and itching  Respiratory: Negative for cough and shortness of breath  Cardiovascular: Positive for palpitations  Negative for chest pain  Gastrointestinal: Positive for nausea  Negative for abdominal pain, constipation, diarrhea and vomiting  Endocrine: Negative for cold intolerance and heat intolerance  Genitourinary: Negative for dysuria     Musculoskeletal: Negative for myalgias  Skin: Negative for color change and rash  Neurological: Positive for headaches  Negative for dizziness and syncope  Hematological: Negative for adenopathy  Psychiatric/Behavioral: Negative for behavioral problems, dysphoric mood and sleep disturbance  The patient is nervous/anxious  Physical Exam   Constitutional: She is oriented to person, place, and time  She appears well-developed and well-nourished  No distress  HENT:   Head: Normocephalic and atraumatic  Eyes: Pupils are equal, round, and reactive to light  Conjunctivae and EOM are normal  Right eye exhibits no discharge  Left eye exhibits no discharge  No scleral icterus  Neck: Normal range of motion  Pulmonary/Chest: Effort normal  No respiratory distress  Neurological: She is alert and oriented to person, place, and time  No cranial nerve deficit  Coordination normal    Skin: No erythema  No pallor  Psychiatric: She has a normal mood and affect  Her behavior is normal         I spent 18 minutes with the patient during this visit

## 2020-04-02 ENCOUNTER — APPOINTMENT (OUTPATIENT)
Dept: LAB | Age: 25
End: 2020-04-02
Payer: COMMERCIAL

## 2020-04-02 DIAGNOSIS — R50.9 FEVER, UNSPECIFIED FEVER CAUSE: ICD-10-CM

## 2020-04-02 DIAGNOSIS — R00.2 PALPITATIONS: ICD-10-CM

## 2020-04-02 DIAGNOSIS — R53.83 FATIGUE, UNSPECIFIED TYPE: ICD-10-CM

## 2020-04-02 LAB
ANION GAP SERPL CALCULATED.3IONS-SCNC: 3 MMOL/L (ref 4–13)
BASOPHILS # BLD AUTO: 0.04 THOUSANDS/ΜL (ref 0–0.1)
BASOPHILS NFR BLD AUTO: 1 % (ref 0–1)
BUN SERPL-MCNC: 14 MG/DL (ref 5–25)
CALCIUM SERPL-MCNC: 9.7 MG/DL (ref 8.3–10.1)
CHLORIDE SERPL-SCNC: 108 MMOL/L (ref 100–108)
CO2 SERPL-SCNC: 28 MMOL/L (ref 21–32)
CREAT SERPL-MCNC: 0.67 MG/DL (ref 0.6–1.3)
EOSINOPHIL # BLD AUTO: 0.12 THOUSAND/ΜL (ref 0–0.61)
EOSINOPHIL NFR BLD AUTO: 2 % (ref 0–6)
ERYTHROCYTE [DISTWIDTH] IN BLOOD BY AUTOMATED COUNT: 13.3 % (ref 11.6–15.1)
GFR SERPL CREATININE-BSD FRML MDRD: 122 ML/MIN/1.73SQ M
GLUCOSE P FAST SERPL-MCNC: 84 MG/DL (ref 65–99)
HCT VFR BLD AUTO: 43.3 % (ref 34.8–46.1)
HGB BLD-MCNC: 13.8 G/DL (ref 11.5–15.4)
IMM GRANULOCYTES # BLD AUTO: 0.04 THOUSAND/UL (ref 0–0.2)
IMM GRANULOCYTES NFR BLD AUTO: 1 % (ref 0–2)
LYMPHOCYTES # BLD AUTO: 3.24 THOUSANDS/ΜL (ref 0.6–4.47)
LYMPHOCYTES NFR BLD AUTO: 39 % (ref 14–44)
MCH RBC QN AUTO: 28 PG (ref 26.8–34.3)
MCHC RBC AUTO-ENTMCNC: 31.9 G/DL (ref 31.4–37.4)
MCV RBC AUTO: 88 FL (ref 82–98)
MONOCYTES # BLD AUTO: 0.49 THOUSAND/ΜL (ref 0.17–1.22)
MONOCYTES NFR BLD AUTO: 6 % (ref 4–12)
NEUTROPHILS # BLD AUTO: 4.31 THOUSANDS/ΜL (ref 1.85–7.62)
NEUTS SEG NFR BLD AUTO: 51 % (ref 43–75)
NRBC BLD AUTO-RTO: 0 /100 WBCS
PLATELET # BLD AUTO: 206 THOUSANDS/UL (ref 149–390)
PMV BLD AUTO: 12.7 FL (ref 8.9–12.7)
POTASSIUM SERPL-SCNC: 4.2 MMOL/L (ref 3.5–5.3)
RBC # BLD AUTO: 4.92 MILLION/UL (ref 3.81–5.12)
SODIUM SERPL-SCNC: 139 MMOL/L (ref 136–145)
TSH SERPL DL<=0.05 MIU/L-ACNC: 1.37 UIU/ML (ref 0.36–3.74)
WBC # BLD AUTO: 8.24 THOUSAND/UL (ref 4.31–10.16)

## 2020-04-02 PROCEDURE — 84443 ASSAY THYROID STIM HORMONE: CPT

## 2020-04-02 PROCEDURE — 36415 COLL VENOUS BLD VENIPUNCTURE: CPT

## 2020-04-02 PROCEDURE — 85025 COMPLETE CBC W/AUTO DIFF WBC: CPT

## 2020-04-02 PROCEDURE — 80048 BASIC METABOLIC PNL TOTAL CA: CPT

## 2020-07-28 ENCOUNTER — ANNUAL EXAM (OUTPATIENT)
Dept: OBGYN CLINIC | Facility: CLINIC | Age: 25
End: 2020-07-28
Payer: COMMERCIAL

## 2020-07-28 VITALS
TEMPERATURE: 97.9 F | HEIGHT: 65 IN | BODY MASS INDEX: 34.79 KG/M2 | DIASTOLIC BLOOD PRESSURE: 68 MMHG | SYSTOLIC BLOOD PRESSURE: 124 MMHG | WEIGHT: 208.8 LBS

## 2020-07-28 DIAGNOSIS — Z01.419 WOMEN'S ANNUAL ROUTINE GYNECOLOGICAL EXAMINATION: Primary | ICD-10-CM

## 2020-07-28 PROBLEM — R59.0 CERVICAL ADENOPATHY: Status: RESOLVED | Noted: 2018-04-03 | Resolved: 2020-07-28

## 2020-07-28 PROCEDURE — 99395 PREV VISIT EST AGE 18-39: CPT | Performed by: OBSTETRICS & GYNECOLOGY

## 2020-07-28 NOTE — PROGRESS NOTES
ASSESSMENT & PLAN:   Diagnoses and all orders for this visit:    Women's annual routine gynecological examination  - due for pap in 2021      The following were reviewed in today's visit: ASCCP guidelines, Gardisil vaccination, STD testing breast self exam, family planning choices, exercise and healthy diet  Patient to return to office in yearly for annual exam      All questions have been answered to her satisfaction  CC:  Annual Gynecologic Examination    HPI: Emily Aldrich is a 30 y o  Reveles Ureña who presents for annual gynecologic examination  She has the following concerns:  Discomfort with intercourse with insertion over the past few months  Feels like pressure and then burning  No correlation to timing of cycle   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  Using pull out method  Health Maintenance:    She exercises 0 days per week  Walks daily with dogs  She wears her seatbelt routinely  She is practicing breast self awareness  Patient does try to follow a balanced diet  Past Medical History:   Diagnosis Date    Anxiety     Urinary tract infection        Past Surgical History:   Procedure Laterality Date    WISDOM TOOTH EXTRACTION         Past OB/Gyn History:  Period Cycle (Days): 28  Period Duration (Days): 5  Period Pattern: Regular  Menstrual Flow: Moderate  Menstrual Control: Tampon, Thin pad  Dysmenorrhea: (!) Mild  Dysmenorrhea Symptoms: CrampingPatient's last menstrual period was 07/21/2020 (approximate)  History of sexually transmitted infection No, is interested in STD testing today  Patient is currently sexually active     Birth control:no method  Gardisil Vaccination completed: yes  Last Pap  2018 : NILM     Family History  Family History   Problem Relation Age of Onset    Hypertension Mother     Hypothyroidism Mother     Colon cancer Maternal Grandfather     Colon cancer Paternal Grandmother     No Known Problems Father     Colon cancer Family     Coronary artery disease Family     Hypertension Family     Hypothyroidism Family        Social History:  Social History     Socioeconomic History    Marital status: /Civil Union     Spouse name: Not on file    Number of children: Not on file    Years of education: Not on file    Highest education level: Not on file   Occupational History    Not on file   Social Needs    Financial resource strain: Not on file    Food insecurity:     Worry: Not on file     Inability: Not on file    Transportation needs:     Medical: Not on file     Non-medical: Not on file   Tobacco Use    Smoking status: Never Smoker    Smokeless tobacco: Never Used   Substance and Sexual Activity    Alcohol use: Yes     Comment: social    Drug use: No    Sexual activity: Yes     Partners: Male     Birth control/protection: None   Lifestyle    Physical activity:     Days per week: Not on file     Minutes per session: Not on file    Stress: Not on file   Relationships    Social connections:     Talks on phone: Not on file     Gets together: Not on file     Attends Holiness service: Not on file     Active member of club or organization: Not on file     Attends meetings of clubs or organizations: Not on file     Relationship status: Not on file    Intimate partner violence:     Fear of current or ex partner: Not on file     Emotionally abused: Not on file     Physically abused: Not on file     Forced sexual activity: Not on file   Other Topics Concern    Not on file   Social History Narrative    Not on file     Presently lives with   She feels safe at home  Patient is currently employed  Allergies:   Allergies   Allergen Reactions    Bee Venom        Medications:    Current Outpatient Medications:     LORazepam (ATIVAN) 0 5 mg tablet, Take 1 tablet (0 5 mg total) by mouth daily as needed for anxiety, Disp: 30 tablet, Rfl: 0    Multiple Vitamin (MULTI-DAY VITAMINS) TABS, Take by mouth, Disp: , Rfl:    ergocalciferol (VITAMIN D2) 50,000 units, Take 1 capsule (50,000 Units total) by mouth once a week for 78 days, Disp: 12 capsule, Rfl: 0    Review of Systems:  Review of Systems   Constitutional: Negative for chills, fever and unexpected weight change  Respiratory: Negative for cough and shortness of breath  Cardiovascular: Negative for chest pain and palpitations  Gastrointestinal: Negative for abdominal distention, abdominal pain, blood in stool, constipation, nausea and vomiting  Genitourinary: Positive for dyspareunia  Negative for difficulty urinating, dysuria, flank pain, genital sores, hematuria, menstrual problem, pelvic pain, urgency, vaginal bleeding, vaginal discharge and vaginal pain  Neurological: Negative for headaches  Physical Exam:  /68   Temp 97 9 °F (36 6 °C)   Ht 5' 5" (1 651 m)   Wt 94 7 kg (208 lb 12 8 oz)   LMP 07/21/2020 (Approximate)   BMI 34 75 kg/m²    Physical Exam   Constitutional: She is oriented to person, place, and time  She appears well-developed and well-nourished  Genitourinary: Vagina normal and uterus normal  Pelvic exam was performed with patient supine  There is no rash, tenderness or lesion on the right labia  There is no rash, tenderness or lesion on the left labia  Vagina exhibits rugosity  No erythema, tenderness or bleeding in the vagina  No vaginal discharge found  Right adnexum does not display mass, does not display tenderness and does not display fullness  Left adnexum does not display mass, does not display tenderness and does not display fullness  Cervix exhibits pinkness  Cervix does not exhibit motion tenderness, lesion or polyp  Uterus is not enlarged, tender, exhibiting a mass, irregular (is regular) or mobile  Genitourinary Comments: No bladder tenderness  Urethral meatus midline, no masses  Mild tenderness with palpation on posterior wall of vagina  No palpable masses  No lesions visualized  Muscles not overly tense  HENT:   Head: Normocephalic and atraumatic  Cardiovascular: Normal rate, regular rhythm and normal heart sounds  Pulmonary/Chest: Effort normal and breath sounds normal  No respiratory distress  Right breast exhibits no inverted nipple, no mass, no nipple discharge, no skin change and no tenderness  Left breast exhibits no inverted nipple, no mass, no nipple discharge, no skin change and no tenderness  Abdominal: Soft  She exhibits no distension  There is no tenderness  There is no guarding  Neurological: She is alert and oriented to person, place, and time  Psychiatric: She has a normal mood and affect  Her behavior is normal    Vitals reviewed

## 2020-09-23 DIAGNOSIS — R29.898 KNEE CLICKING: Primary | ICD-10-CM

## 2020-09-24 ENCOUNTER — EVALUATION (OUTPATIENT)
Dept: PHYSICAL THERAPY | Age: 25
End: 2020-09-24
Payer: COMMERCIAL

## 2020-09-24 DIAGNOSIS — M22.2X1 PATELLOFEMORAL DISORDER OF RIGHT KNEE: Primary | ICD-10-CM

## 2020-09-24 PROCEDURE — 97161 PT EVAL LOW COMPLEX 20 MIN: CPT | Performed by: PHYSICAL THERAPIST

## 2020-09-24 PROCEDURE — 97110 THERAPEUTIC EXERCISES: CPT | Performed by: PHYSICAL THERAPIST

## 2020-09-24 NOTE — PROGRESS NOTES
PT Evaluation     Today's date: 2020  Patient name: Kanwal Gutierrez  : 1995  MRN: [id number]  Referring provider: Fadumo Ge DO  Dx:   Encounter Diagnosis     ICD-10-CM    1  Patellofemoral disorder of right knee  M22 2X1                   Assessment  Assessment details: Pt presents to anterior R knee pain that began roughly 3 weeks ago  Pt has decreased LE strength, particularly proximal hip strength that is contributing to knee pain via knee valgus with squatting  Pt's pain is likely patellofemoral pain, with meniscal tear possible  Pt will benefit from skilled PT in order to decrease pain with ADLs and return to prior exercise program    Impairments: abnormal coordination, abnormal or restricted ROM, abnormal movement, activity intolerance, impaired physical strength, lacks appropriate home exercise program, pain with function, poor posture  and poor body mechanics    Goals  In 4 weeks pt will:  -Be independent with phase I of HEP  -Increase LE strength by 1/2 grade    By discharge pt will:  -Be independent with Phase II of HEP  -Demonstrate 5/5 B LE strength  -Return to exercise with no pain      Plan  Patient would benefit from: skilled physical therapy  Planned modality interventions: TENS, thermotherapy: hydrocollator packs and cryotherapy  Planned therapy interventions: joint mobilization, manual therapy, abdominal trunk stabilization, motor coordination training, muscle pump exercises, neuromuscular re-education, patient education, therapeutic activities, therapeutic exercise, home exercise program and functional ROM exercises  Frequency: 2x week  Duration in weeks: 6  Plan of Care beginning date: 2020  Plan of Care expiration date: 2020  Treatment plan discussed with: patient and PTA        Subjective Evaluation    History of Present Illness  Mechanism of injury: Pt presents to anterior R knee pain that began roughly 3 weeks ago   Pt has had pain for roughly 3 weeks and states pain is minimal except with activity  Pain  Current pain ratin  At best pain ratin  At worst pain ratin    Patient Goals  Patient goals for therapy: independence with ADLs/IADLs, return to sport/leisure activities, increased motion, improved balance and increased strength          Objective     Strength/Myotome Testing     Left Hip   Planes of Motion   Abduction: 3+  External rotation: 3+    Right Hip   Planes of Motion   Abduction: 3+  External rotation: 3+    Left Knee   Extension: 5    Right Knee   Extension: 5    Tests     Right Knee   Positive medial Cleveland  Negative anterior drawer, Apley's compression, lateral Cleveland, Thessaly's test at 5 degrees, Thessaly's test at 20 degrees, valgus stress test at 30 degrees and varus stress test at 30 degrees                Precautions: N/A      Manuals                                                                 Neuro Re-Ed                                                                                                        Ther Ex             Quad stretch 5x30s            Leg press  50# 3x10            Knee ext cybex-eccentrics 2 5 plates 3V19            Sidestep with band YTB 20'                                                                Ther Activity                                       Gait Training                                       Modalities

## 2020-10-01 ENCOUNTER — OFFICE VISIT (OUTPATIENT)
Dept: PHYSICAL THERAPY | Age: 25
End: 2020-10-01
Payer: COMMERCIAL

## 2020-10-01 DIAGNOSIS — M22.2X1 PATELLOFEMORAL DISORDER OF RIGHT KNEE: Primary | ICD-10-CM

## 2020-10-01 PROCEDURE — 97140 MANUAL THERAPY 1/> REGIONS: CPT | Performed by: PHYSICAL THERAPIST

## 2020-10-01 PROCEDURE — 97110 THERAPEUTIC EXERCISES: CPT | Performed by: PHYSICAL THERAPIST

## 2020-10-06 ENCOUNTER — TELEMEDICINE (OUTPATIENT)
Dept: FAMILY MEDICINE CLINIC | Facility: OTHER | Age: 25
End: 2020-10-06
Payer: COMMERCIAL

## 2020-10-06 ENCOUNTER — APPOINTMENT (OUTPATIENT)
Dept: LAB | Facility: CLINIC | Age: 25
End: 2020-10-06
Payer: COMMERCIAL

## 2020-10-06 DIAGNOSIS — F43.0 ANXIETY AS ACUTE REACTION TO EXCEPTIONAL STRESS: ICD-10-CM

## 2020-10-06 DIAGNOSIS — R30.0 DYSURIA: Primary | ICD-10-CM

## 2020-10-06 DIAGNOSIS — F41.1 ANXIETY AS ACUTE REACTION TO EXCEPTIONAL STRESS: ICD-10-CM

## 2020-10-06 LAB
BILIRUB UR QL STRIP: NEGATIVE
CLARITY UR: NORMAL
COLOR UR: YELLOW
GLUCOSE UR STRIP-MCNC: NEGATIVE MG/DL
HGB UR QL STRIP.AUTO: NEGATIVE
KETONES UR STRIP-MCNC: NEGATIVE MG/DL
LEUKOCYTE ESTERASE UR QL STRIP: NEGATIVE
NITRITE UR QL STRIP: NEGATIVE
PH UR STRIP.AUTO: 7.5 [PH]
PROT UR STRIP-MCNC: NEGATIVE MG/DL
SP GR UR STRIP.AUTO: 1.01 (ref 1–1.03)
UROBILINOGEN UR QL STRIP.AUTO: 0.2 E.U./DL

## 2020-10-06 PROCEDURE — 81003 URINALYSIS AUTO W/O SCOPE: CPT | Performed by: FAMILY MEDICINE

## 2020-10-06 PROCEDURE — 99213 OFFICE O/P EST LOW 20 MIN: CPT | Performed by: FAMILY MEDICINE

## 2020-10-06 RX ORDER — SULFAMETHOXAZOLE AND TRIMETHOPRIM 800; 160 MG/1; MG/1
1 TABLET ORAL EVERY 12 HOURS SCHEDULED
Qty: 10 TABLET | Refills: 0 | Status: SHIPPED | OUTPATIENT
Start: 2020-10-06 | End: 2020-10-11

## 2020-10-06 RX ORDER — LORAZEPAM 0.5 MG/1
0.5 TABLET ORAL DAILY PRN
Qty: 30 TABLET | Refills: 0 | Status: SHIPPED | OUTPATIENT
Start: 2020-10-06 | End: 2021-03-24 | Stop reason: SDUPTHER

## 2020-10-06 RX ORDER — CHOLECALCIFEROL (VITAMIN D3) 125 MCG
2000 CAPSULE ORAL DAILY
COMMUNITY
End: 2021-03-23 | Stop reason: HOSPADM

## 2020-10-08 ENCOUNTER — OFFICE VISIT (OUTPATIENT)
Dept: PHYSICAL THERAPY | Age: 25
End: 2020-10-08
Payer: COMMERCIAL

## 2020-10-08 DIAGNOSIS — M22.2X1 PATELLOFEMORAL DISORDER OF RIGHT KNEE: Primary | ICD-10-CM

## 2020-10-08 PROCEDURE — 97140 MANUAL THERAPY 1/> REGIONS: CPT | Performed by: PHYSICAL THERAPIST

## 2020-10-08 PROCEDURE — 97110 THERAPEUTIC EXERCISES: CPT | Performed by: PHYSICAL THERAPIST

## 2020-10-15 ENCOUNTER — APPOINTMENT (OUTPATIENT)
Dept: PHYSICAL THERAPY | Age: 25
End: 2020-10-15
Payer: COMMERCIAL

## 2020-10-22 ENCOUNTER — OFFICE VISIT (OUTPATIENT)
Dept: PHYSICAL THERAPY | Age: 25
End: 2020-10-22
Payer: COMMERCIAL

## 2020-10-22 DIAGNOSIS — M22.2X1 PATELLOFEMORAL DISORDER OF RIGHT KNEE: Primary | ICD-10-CM

## 2020-10-22 PROCEDURE — 97110 THERAPEUTIC EXERCISES: CPT | Performed by: PHYSICAL THERAPIST

## 2020-10-22 PROCEDURE — 97164 PT RE-EVAL EST PLAN CARE: CPT | Performed by: PHYSICAL THERAPIST

## 2020-10-30 DIAGNOSIS — Z20.822 CLOSE EXPOSURE TO COVID-19 VIRUS: Primary | ICD-10-CM

## 2020-10-31 DIAGNOSIS — Z20.822 CLOSE EXPOSURE TO COVID-19 VIRUS: ICD-10-CM

## 2020-10-31 PROCEDURE — U0003 INFECTIOUS AGENT DETECTION BY NUCLEIC ACID (DNA OR RNA); SEVERE ACUTE RESPIRATORY SYNDROME CORONAVIRUS 2 (SARS-COV-2) (CORONAVIRUS DISEASE [COVID-19]), AMPLIFIED PROBE TECHNIQUE, MAKING USE OF HIGH THROUGHPUT TECHNOLOGIES AS DESCRIBED BY CMS-2020-01-R: HCPCS | Performed by: FAMILY MEDICINE

## 2020-11-01 DIAGNOSIS — R05.9 COUGH: Primary | ICD-10-CM

## 2020-11-01 DIAGNOSIS — Z20.822 CLOSE EXPOSURE TO COVID-19 VIRUS: ICD-10-CM

## 2020-11-01 LAB — SARS-COV-2 RNA SPEC QL NAA+PROBE: NOT DETECTED

## 2020-11-02 DIAGNOSIS — Z20.822 CLOSE EXPOSURE TO COVID-19 VIRUS: ICD-10-CM

## 2020-11-02 DIAGNOSIS — R05.9 COUGH: ICD-10-CM

## 2020-11-02 PROCEDURE — U0003 INFECTIOUS AGENT DETECTION BY NUCLEIC ACID (DNA OR RNA); SEVERE ACUTE RESPIRATORY SYNDROME CORONAVIRUS 2 (SARS-COV-2) (CORONAVIRUS DISEASE [COVID-19]), AMPLIFIED PROBE TECHNIQUE, MAKING USE OF HIGH THROUGHPUT TECHNOLOGIES AS DESCRIBED BY CMS-2020-01-R: HCPCS | Performed by: FAMILY MEDICINE

## 2020-11-03 ENCOUNTER — TELEPHONE (OUTPATIENT)
Dept: FAMILY MEDICINE CLINIC | Facility: OTHER | Age: 25
End: 2020-11-03

## 2020-11-03 LAB — SARS-COV-2 RNA SPEC QL NAA+PROBE: DETECTED

## 2020-11-05 ENCOUNTER — TELEMEDICINE (OUTPATIENT)
Dept: FAMILY MEDICINE CLINIC | Facility: OTHER | Age: 25
End: 2020-11-05
Payer: COMMERCIAL

## 2020-11-05 VITALS — TEMPERATURE: 99 F

## 2020-11-05 DIAGNOSIS — U07.1 COVID-19 VIRUS INFECTION: Primary | ICD-10-CM

## 2020-11-05 PROCEDURE — 99213 OFFICE O/P EST LOW 20 MIN: CPT | Performed by: FAMILY MEDICINE

## 2020-11-19 ENCOUNTER — TELEMEDICINE (OUTPATIENT)
Dept: FAMILY MEDICINE CLINIC | Facility: OTHER | Age: 25
End: 2020-11-19
Payer: COMMERCIAL

## 2020-11-19 DIAGNOSIS — R53.83 FATIGUE, UNSPECIFIED TYPE: ICD-10-CM

## 2020-11-19 DIAGNOSIS — U07.1 COVID-19 VIRUS INFECTION: Primary | ICD-10-CM

## 2020-11-19 DIAGNOSIS — R06.02 SHORTNESS OF BREATH: ICD-10-CM

## 2020-11-19 PROCEDURE — 99213 OFFICE O/P EST LOW 20 MIN: CPT | Performed by: FAMILY MEDICINE

## 2020-11-19 RX ORDER — ALBUTEROL SULFATE 90 UG/1
2 AEROSOL, METERED RESPIRATORY (INHALATION) EVERY 6 HOURS PRN
Qty: 1 INHALER | Refills: 5 | Status: SHIPPED | OUTPATIENT
Start: 2020-11-19 | End: 2021-11-03

## 2021-01-17 ENCOUNTER — IMMUNIZATIONS (OUTPATIENT)
Dept: FAMILY MEDICINE CLINIC | Facility: HOSPITAL | Age: 26
End: 2021-01-17

## 2021-01-17 DIAGNOSIS — Z23 ENCOUNTER FOR IMMUNIZATION: Primary | ICD-10-CM

## 2021-01-17 PROCEDURE — 91300 SARS-COV-2 / COVID-19 MRNA VACCINE (PFIZER-BIONTECH) 30 MCG: CPT

## 2021-01-17 PROCEDURE — 0001A SARS-COV-2 / COVID-19 MRNA VACCINE (PFIZER-BIONTECH) 30 MCG: CPT

## 2021-02-06 ENCOUNTER — IMMUNIZATIONS (OUTPATIENT)
Dept: FAMILY MEDICINE CLINIC | Facility: HOSPITAL | Age: 26
End: 2021-02-06

## 2021-02-06 DIAGNOSIS — Z23 ENCOUNTER FOR IMMUNIZATION: Primary | ICD-10-CM

## 2021-02-06 PROCEDURE — 91300 SARS-COV-2 / COVID-19 MRNA VACCINE (PFIZER-BIONTECH) 30 MCG: CPT

## 2021-02-06 PROCEDURE — 0002A SARS-COV-2 / COVID-19 MRNA VACCINE (PFIZER-BIONTECH) 30 MCG: CPT

## 2021-03-23 ENCOUNTER — OFFICE VISIT (OUTPATIENT)
Dept: FAMILY MEDICINE CLINIC | Facility: OTHER | Age: 26
End: 2021-03-23
Payer: COMMERCIAL

## 2021-03-23 VITALS
HEIGHT: 65 IN | HEART RATE: 80 BPM | OXYGEN SATURATION: 98 % | WEIGHT: 189.2 LBS | BODY MASS INDEX: 31.52 KG/M2 | DIASTOLIC BLOOD PRESSURE: 78 MMHG | RESPIRATION RATE: 12 BRPM | SYSTOLIC BLOOD PRESSURE: 116 MMHG | TEMPERATURE: 98 F

## 2021-03-23 DIAGNOSIS — Z00.00 ANNUAL PHYSICAL EXAM: Primary | ICD-10-CM

## 2021-03-23 DIAGNOSIS — F43.0 ANXIETY AS ACUTE REACTION TO EXCEPTIONAL STRESS: ICD-10-CM

## 2021-03-23 DIAGNOSIS — Z13.6 SCREENING FOR CARDIOVASCULAR CONDITION: ICD-10-CM

## 2021-03-23 DIAGNOSIS — F41.1 ANXIETY AS ACUTE REACTION TO EXCEPTIONAL STRESS: ICD-10-CM

## 2021-03-23 DIAGNOSIS — Z13.1 SCREENING FOR DIABETES MELLITUS: ICD-10-CM

## 2021-03-23 PROCEDURE — 99395 PREV VISIT EST AGE 18-39: CPT | Performed by: FAMILY MEDICINE

## 2021-03-23 NOTE — PATIENT INSTRUCTIONS

## 2021-03-23 NOTE — PROGRESS NOTES
[facility] PRACTICE [facility]    NAME: Verner Notice  AGE: 30 y o  SEX: female  : 1995     DATE: 3/24/2021     Assessment and Plan:     Problem List Items Addressed This Visit     None      Visit Diagnoses     Annual physical exam    -  Primary    Screening for cardiovascular condition        Relevant Orders    Lipid panel    Screening for diabetes mellitus        Relevant Orders    Basic metabolic panel    BMI 47 7-72 5,LFXKY        Relevant Orders    Basic metabolic panel    TSH, 3rd generation with Free T4 reflex    Lipid panel          Immunizations and preventive care screenings were discussed with patient today  Appropriate education was printed on patient's after visit summary  Counseling:  Alcohol/drug use: discussed moderation in alcohol intake, the recommendations for healthy alcohol use, and avoidance of illicit drug use  Dental Health: discussed importance of regular tooth brushing, flossing, and dental visits  Injury prevention: discussed safety/seat belts, safety helmets, smoke detectors, carbon dioxide detectors, and smoking near bedding or upholstery  Sexual health: discussed sexually transmitted diseases, partner selection, use of condoms, avoidance of unintended pregnancy, and contraceptive alternatives  · Exercise: the importance of regular exercise/physical activity was discussed  Recommend exercise 3-5 times per week for at least 30 minutes  BMI Counseling: Body mass index is 31 48 kg/m²  The BMI is above normal  Nutrition recommendations include decreasing portion sizes, encouraging healthy choices of fruits and vegetables, decreasing fast food intake, consuming healthier snacks, limiting drinks that contain sugar, moderation in carbohydrate intake, increasing intake of lean protein, reducing intake of saturated and trans fat and reducing intake of cholesterol   Exercise recommendations include moderate physical activity 150 minutes/week  Return in about 1 year (around 3/23/2022) for Annual physical      Chief Complaint:     Chief Complaint   Patient presents with    Physical Exam      History of Present Illness:     Adult Annual Physical   Patient here for a comprehensive physical exam  The patient reports no problems  Diet and Physical Activity  · Diet/Nutrition: well balanced diet, limited junk food, consuming 3-5 servings of fruits/vegetables daily, adequate fiber intake and adequate whole grain intake  · Exercise: moderate cardiovascular exercise, 5-7 times a week on average and 1-2 hours on average  Depression Screening  PHQ-9 Depression Screening    PHQ-9:   Frequency of the following problems over the past two weeks:      Little interest or pleasure in doing things: 0 - not at all  Feeling down, depressed, or hopeless: 0 - not at all  PHQ-2 Score: 0       General Health  · Sleep: sleeps well and gets 7-8 hours of sleep on average  · Hearing: normal - bilateral   · Vision: no vision problems  · Dental: regular dental visits  /GYN Health  · Last menstrual period: 3/15/21  · Contraceptive method: none  · History of STDs?: no      Review of Systems:     Review of Systems   Constitutional: Negative for appetite change, fatigue, fever and unexpected weight change  HENT: Negative for congestion, dental problem, ear pain, postnasal drip, sore throat and tinnitus  Eyes: Negative for pain, discharge and visual disturbance  Respiratory: Negative for cough, shortness of breath and wheezing  Cardiovascular: Negative for chest pain, palpitations and leg swelling  Gastrointestinal: Negative for abdominal pain, constipation, diarrhea, nausea and vomiting  Endocrine: Negative for cold intolerance and heat intolerance  Genitourinary: Negative for difficulty urinating, dysuria, flank pain and urgency     Musculoskeletal: Negative for arthralgias, back pain, joint swelling and myalgias  Skin: Negative for rash and wound  Allergic/Immunologic: Negative for immunocompromised state  Neurological: Negative for dizziness, syncope, speech difficulty, weakness and numbness  Hematological: Negative for adenopathy  Does not bruise/bleed easily  Psychiatric/Behavioral: Negative for confusion, dysphoric mood and sleep disturbance  The patient is not nervous/anxious         Past Medical History:     Past Medical History:   Diagnosis Date    Anxiety     Urinary tract infection       Past Surgical History:     Past Surgical History:   Procedure Laterality Date    WISDOM TOOTH EXTRACTION        Social History:     E-Cigarette/Vaping    E-Cigarette Use Never User      E-Cigarette/Vaping Substances    Nicotine No     THC No     CBD No     Flavoring No     Other No     Unknown No      Social History     Socioeconomic History    Marital status: /Civil Union     Spouse name: None    Number of children: None    Years of education: None    Highest education level: None   Occupational History    None   Social Needs    Financial resource strain: None    Food insecurity     Worry: None     Inability: None    Transportation needs     Medical: None     Non-medical: None   Tobacco Use    Smoking status: Never Smoker    Smokeless tobacco: Never Used   Substance and Sexual Activity    Alcohol use: Yes     Comment: social    Drug use: No    Sexual activity: Yes     Partners: Male     Birth control/protection: None   Lifestyle    Physical activity     Days per week: None     Minutes per session: None    Stress: None   Relationships    Social connections     Talks on phone: None     Gets together: None     Attends Spiritism service: None     Active member of club or organization: None     Attends meetings of clubs or organizations: None     Relationship status: None    Intimate partner violence     Fear of current or ex partner: None     Emotionally abused: None     Physically abused: None     Forced sexual activity: None   Other Topics Concern    None   Social History Narrative    None      Family History:     Family History   Problem Relation Age of Onset    Hypertension Mother     Hypothyroidism Mother     Colon cancer Maternal Grandfather     Colon cancer Paternal Grandmother     No Known Problems Father     Colon cancer Family     Coronary artery disease Family     Hypertension Family     Hypothyroidism Family       Current Medications:     Current Outpatient Medications   Medication Sig Dispense Refill    LORazepam (ATIVAN) 0 5 mg tablet Take 1 tablet (0 5 mg total) by mouth daily as needed for anxiety 30 tablet 0    Multiple Vitamin (MULTI-DAY VITAMINS) TABS Take by mouth      albuterol (PROVENTIL HFA,VENTOLIN HFA) 90 mcg/act inhaler Inhale 2 puffs every 6 (six) hours as needed for wheezing or shortness of breath (Patient not taking: Reported on 3/23/2021) 1 Inhaler 5     No current facility-administered medications for this visit  Allergies: Allergies   Allergen Reactions    Bee Venom Facial Swelling      Physical Exam:     /78   Pulse 80   Temp 98 °F (36 7 °C) (Temporal)   Resp 12   Ht 5' 5" (1 651 m)   Wt 85 8 kg (189 lb 3 2 oz)   SpO2 98%   BMI 31 48 kg/m²     Physical Exam  Vitals signs and nursing note reviewed  Constitutional:       General: She is not in acute distress  Appearance: Normal appearance  She is well-developed  She is not ill-appearing  HENT:      Head: Normocephalic and atraumatic  Right Ear: Hearing, tympanic membrane, ear canal and external ear normal       Left Ear: Hearing, tympanic membrane, ear canal and external ear normal       Nose: Nose normal       Mouth/Throat:      Mouth: Mucous membranes are moist       Pharynx: Oropharynx is clear  Uvula midline  Eyes:      General: No scleral icterus  Conjunctiva/sclera: Conjunctivae normal       Pupils: Pupils are equal, round, and reactive to light  Neck:      Musculoskeletal: Normal range of motion and neck supple  Thyroid: No thyromegaly  Cardiovascular:      Rate and Rhythm: Normal rate and regular rhythm  Heart sounds: Normal heart sounds  No murmur  Pulmonary:      Effort: Pulmonary effort is normal  No respiratory distress  Breath sounds: Normal breath sounds  No wheezing  Abdominal:      General: Bowel sounds are normal  There is no distension  Palpations: Abdomen is soft  Tenderness: There is no abdominal tenderness  Musculoskeletal: Normal range of motion  General: No tenderness  Right lower leg: No edema  Left lower leg: No edema  Lymphadenopathy:      Cervical: No cervical adenopathy  Skin:     General: Skin is warm and dry  Coloration: Skin is not jaundiced  Findings: No erythema or rash  Neurological:      General: No focal deficit present  Mental Status: She is alert and oriented to person, place, and time  Cranial Nerves: No cranial nerve deficit     Psychiatric:         Mood and Affect: Mood normal          Behavior: Behavior normal           Jennifer Marvin, DO   [facility]

## 2021-03-24 RX ORDER — LORAZEPAM 0.5 MG/1
0.5 TABLET ORAL DAILY PRN
Qty: 30 TABLET | Refills: 0 | Status: SHIPPED | OUTPATIENT
Start: 2021-03-24 | End: 2022-02-07 | Stop reason: ALTCHOICE

## 2021-05-25 ENCOUNTER — TRANSCRIBE ORDERS (OUTPATIENT)
Dept: LAB | Facility: CLINIC | Age: 26
End: 2021-05-25

## 2021-05-25 ENCOUNTER — APPOINTMENT (OUTPATIENT)
Dept: LAB | Facility: CLINIC | Age: 26
End: 2021-05-25
Payer: COMMERCIAL

## 2021-05-25 DIAGNOSIS — Z13.1 SCREENING FOR DIABETES MELLITUS: ICD-10-CM

## 2021-05-25 DIAGNOSIS — Z00.8 HEALTH EXAMINATION IN POPULATION SURVEYS: ICD-10-CM

## 2021-05-25 DIAGNOSIS — Z00.8 ENCOUNTER FOR OTHER GENERAL EXAMINATION: Primary | ICD-10-CM

## 2021-05-25 DIAGNOSIS — Z13.6 SCREENING FOR CARDIOVASCULAR CONDITION: ICD-10-CM

## 2021-05-25 DIAGNOSIS — Z00.8 ENCOUNTER FOR OTHER GENERAL EXAMINATION: ICD-10-CM

## 2021-05-25 LAB
ANION GAP SERPL CALCULATED.3IONS-SCNC: 6 MMOL/L (ref 4–13)
BUN SERPL-MCNC: 11 MG/DL (ref 5–25)
CALCIUM SERPL-MCNC: 9.6 MG/DL (ref 8.3–10.1)
CHLORIDE SERPL-SCNC: 107 MMOL/L (ref 100–108)
CHOLEST SERPL-MCNC: 143 MG/DL (ref 50–200)
CO2 SERPL-SCNC: 26 MMOL/L (ref 21–32)
CREAT SERPL-MCNC: 0.59 MG/DL (ref 0.6–1.3)
EST. AVERAGE GLUCOSE BLD GHB EST-MCNC: 97 MG/DL
GFR SERPL CREATININE-BSD FRML MDRD: 125 ML/MIN/1.73SQ M
GLUCOSE P FAST SERPL-MCNC: 82 MG/DL (ref 65–99)
HBA1C MFR BLD: 5 %
HDLC SERPL-MCNC: 59 MG/DL
LDLC SERPL CALC-MCNC: 74 MG/DL (ref 0–100)
NONHDLC SERPL-MCNC: 84 MG/DL
POTASSIUM SERPL-SCNC: 4 MMOL/L (ref 3.5–5.3)
SODIUM SERPL-SCNC: 139 MMOL/L (ref 136–145)
TRIGL SERPL-MCNC: 49 MG/DL
TSH SERPL DL<=0.05 MIU/L-ACNC: 1.57 UIU/ML (ref 0.36–3.74)

## 2021-05-25 PROCEDURE — 36415 COLL VENOUS BLD VENIPUNCTURE: CPT

## 2021-05-25 PROCEDURE — 84443 ASSAY THYROID STIM HORMONE: CPT

## 2021-05-25 PROCEDURE — 83036 HEMOGLOBIN GLYCOSYLATED A1C: CPT

## 2021-05-25 PROCEDURE — 80048 BASIC METABOLIC PNL TOTAL CA: CPT

## 2021-05-25 PROCEDURE — 80061 LIPID PANEL: CPT

## 2021-05-28 ENCOUNTER — TELEPHONE (OUTPATIENT)
Dept: FAMILY MEDICINE CLINIC | Facility: OTHER | Age: 26
End: 2021-05-28

## 2021-05-28 NOTE — TELEPHONE ENCOUNTER
----- Message from Nasir Roberts MD sent at 5/28/2021 11:31 AM EDT -----  The blood work result is normal

## 2021-07-21 ENCOUNTER — TELEPHONE (OUTPATIENT)
Dept: FAMILY MEDICINE CLINIC | Facility: OTHER | Age: 26
End: 2021-07-21

## 2021-07-21 DIAGNOSIS — B34.9 VIRAL INFECTION, UNSPECIFIED: Primary | ICD-10-CM

## 2021-07-21 PROCEDURE — U0003 INFECTIOUS AGENT DETECTION BY NUCLEIC ACID (DNA OR RNA); SEVERE ACUTE RESPIRATORY SYNDROME CORONAVIRUS 2 (SARS-COV-2) (CORONAVIRUS DISEASE [COVID-19]), AMPLIFIED PROBE TECHNIQUE, MAKING USE OF HIGH THROUGHPUT TECHNOLOGIES AS DESCRIBED BY CMS-2020-01-R: HCPCS | Performed by: FAMILY MEDICINE

## 2021-07-21 PROCEDURE — U0005 INFEC AGEN DETEC AMPLI PROBE: HCPCS | Performed by: FAMILY MEDICINE

## 2021-07-21 NOTE — TELEPHONE ENCOUNTER
Spoke with patient to notify her of the order being in for the covid swab but patient had already gotten it done from her dr that she works with

## 2021-07-21 NOTE — TELEPHONE ENCOUNTER
Temp 100 3, sore throat, congestion  Patient would like to get a covid swab done?  Please advise  She is a employee of [employer] and she also stated she needs it done to continuing working   Please advise   Thank you

## 2021-07-21 NOTE — TELEPHONE ENCOUNTER
Order placed, please advise pt to go to [facility] location for collection  Thanks!   Polina Castro, DO

## 2021-11-03 ENCOUNTER — OFFICE VISIT (OUTPATIENT)
Dept: OBGYN CLINIC | Facility: CLINIC | Age: 26
End: 2021-11-03
Payer: COMMERCIAL

## 2021-11-03 VITALS — BODY MASS INDEX: 33.45 KG/M2 | DIASTOLIC BLOOD PRESSURE: 72 MMHG | SYSTOLIC BLOOD PRESSURE: 112 MMHG | WEIGHT: 201 LBS

## 2021-11-03 DIAGNOSIS — Z01.419 WELL WOMAN EXAM WITH ROUTINE GYNECOLOGICAL EXAM: Primary | ICD-10-CM

## 2021-11-03 PROCEDURE — G0145 SCR C/V CYTO,THINLAYER,RESCR: HCPCS | Performed by: OBSTETRICS & GYNECOLOGY

## 2021-11-03 PROCEDURE — G0476 HPV COMBO ASSAY CA SCREEN: HCPCS | Performed by: OBSTETRICS & GYNECOLOGY

## 2021-11-03 PROCEDURE — 99395 PREV VISIT EST AGE 18-39: CPT | Performed by: OBSTETRICS & GYNECOLOGY

## 2021-11-05 LAB
HPV HR 12 DNA CVX QL NAA+PROBE: NEGATIVE
HPV16 DNA CVX QL NAA+PROBE: NEGATIVE
HPV18 DNA CVX QL NAA+PROBE: NEGATIVE

## 2021-11-08 LAB
LAB AP GYN PRIMARY INTERPRETATION: NORMAL
Lab: NORMAL

## 2022-02-07 ENCOUNTER — OFFICE VISIT (OUTPATIENT)
Dept: OBGYN CLINIC | Facility: CLINIC | Age: 27
End: 2022-02-07
Payer: COMMERCIAL

## 2022-02-07 VITALS
DIASTOLIC BLOOD PRESSURE: 72 MMHG | SYSTOLIC BLOOD PRESSURE: 118 MMHG | WEIGHT: 208 LBS | BODY MASS INDEX: 34.66 KG/M2 | HEIGHT: 65 IN

## 2022-02-07 DIAGNOSIS — N91.2 AMENORRHEA: Primary | ICD-10-CM

## 2022-02-07 LAB — SL AMB POCT URINE HCG: POSITIVE

## 2022-02-07 PROCEDURE — 81025 URINE PREGNANCY TEST: CPT | Performed by: PHYSICIAN ASSISTANT

## 2022-02-07 PROCEDURE — 76801 OB US < 14 WKS SINGLE FETUS: CPT | Performed by: PHYSICIAN ASSISTANT

## 2022-02-07 PROCEDURE — 99213 OFFICE O/P EST LOW 20 MIN: CPT | Performed by: PHYSICIAN ASSISTANT

## 2022-02-07 NOTE — PROGRESS NOTES
Assessment/Plan:  - Viable IUP @ 7w4d today  - BINTA 22  - Continue PNV  - Call for concerns  - RTO 2 weeks for OB intake     Diagnoses and all orders for this visit:    Amenorrhea  -     POCT urine HCG  -     AMB US OB < 14 weeks single or first gestation level 1    Other orders  -     Prenatal MV-Min-Fe Fum-FA-DHA (PRENATAL 1 PO); Take by mouth          Subjective:      Patient ID: Maria Luz Reinoso is a 27 y o  female  Leatha is a 25YO  WF presenting to the office for pregnancy confirmation via [address]  She is feeling nervous today  She reports some mild cramping but denies bleeding  She states her LMP as 21, placing her at 8w0d today  The following portions of the patient's history were reviewed and updated as appropriate: allergies, current medications, past family history, past medical history, past social history, past surgical history and problem list     Review of Systems   Constitutional: Negative for chills, fever and unexpected weight change  Respiratory: Negative for shortness of breath  Cardiovascular: Negative for chest pain  Gastrointestinal: Positive for nausea (mild, on empty stomach)  Negative for abdominal pain  Skin: Negative for rash  Objective:      Ht 5' 5" (1 651 m)   Wt 94 3 kg (208 lb)   LMP 2021 (Exact Date)   BMI 34 61 kg/m²          Physical Exam  Vitals reviewed  Constitutional:       Appearance: Normal appearance  She is normal weight  HENT:      Head: Normocephalic and atraumatic  Pulmonary:      Effort: Pulmonary effort is normal    Genitourinary:     General: Normal vulva  Labia:         Right: No rash or lesion  Left: No rash or lesion  Comments: TVUS reveals IUP, yolk sac, fetal pole, +CM   CRL 1 32 cm (7w4d)   BPM   BINTA 22  Skin:     General: Skin is warm and dry  Neurological:      General: No focal deficit present  Mental Status: She is alert     Psychiatric:         Mood and Affect: Mood normal          Behavior: Behavior normal            Ultrasound Probe Disinfection    A transvaginal ultrasound was performed     Prior to use, disinfection was performed with High Level Disinfection Process (Trophon)  Probe serial number [id number]: [id number] was used    Keira Noel PA-C  02/07/22  9:31 AM

## 2022-02-22 ENCOUNTER — INITIAL PRENATAL (OUTPATIENT)
Dept: OBGYN CLINIC | Facility: CLINIC | Age: 27
End: 2022-02-22

## 2022-02-22 VITALS — BODY MASS INDEX: 34.28 KG/M2 | WEIGHT: 206 LBS | SYSTOLIC BLOOD PRESSURE: 110 MMHG | DIASTOLIC BLOOD PRESSURE: 64 MMHG

## 2022-02-22 DIAGNOSIS — Z3A.10 10 WEEKS GESTATION OF PREGNANCY: Primary | ICD-10-CM

## 2022-02-22 PROCEDURE — OBC

## 2022-02-22 NOTE — PROGRESS NOTES
Patient presents for OB intake interview  Patient oriented to practice, different practice providers, different practice locations  Reviewed expected prenatal visit schedule  Accompanied by:  (Philip)    OB History        1    Para   0    Term   0       0    AB   0    Living   0       SAB   0    IAB   0    Ectopic   0    Multiple   0    Live Births   0                 Hx of  delivery prior to 36 weeks 6 days:     Last Menstrual Period: Patient's last menstrual period was 2021 (exact date)  Estimated Date of Delivery: 22 by LMP                Signs/Symptoms of Pregnancy                            Breast tenderness: yes              Fatigue: yes               Cramping: yes              Nausea/Vomiting: nausea      Pregravid BMI: Body mass index is 34 28 kg/m²  Discussed appropriate weight gain in pregnancy based on pre-gravid BMI       Diabetes              Pregestational DM:  no              hx of GDM: no              BMI >35: no              first degree relative with type 2 diabetes: no              hx of PCOS: no              current metformin use: no              prior hx of LGA/macrosomia: no                   Hypertension              Hx of chronic HTN: no              hx of gestational HTN: no              hx of preeclampsia, eclampsia, or HELLP syndrome: no              Family h/o preeclampsia: no              Age 28 or older: no              Multifetal gestation: no  Type 1 or Type 2 DM: no  Renal Disease: no  Autoimmune disease (systemic lupus erythematosus, antiphospholipid antibody syndrome):  Nulliparity: yes  Obesity (BMI over 30): yes  More than 10 year pregnancy interval: no  Previous IUGR, low birthweight or small for gestational age:no        Infection Screening              Does the pt have a hx of MRSA? no              Recent travel outside of [place]? no  Zika precautions discussed     Immunizations:              influenza vaccine given today: already vaccinated              discussed Tdap vaccine administration at 27-28 weeks   COVID vaccine: Yes, boosted        Interview education              [facility] Pregnancy Essentials Book reviewed and discussed                          Handouts given:                          Nutrition During Pregnancy  Prenatal Genetic Screening Tests                          Immunization & Pregnancy                          Medications & Pregnancy                          Warning Signs During Pregnancy                          Baby and Me support center                          [facility] [address] and [address]ing [address] in Pregnancy    Dental visit with last 6 months - yes           MyChart activated (not 1518 years of age)?: yes  § Code provided? n/a     [facility]: no  ONAF form submitted: n/a

## 2022-02-24 ENCOUNTER — APPOINTMENT (OUTPATIENT)
Dept: LAB | Facility: CLINIC | Age: 27
End: 2022-02-24
Payer: COMMERCIAL

## 2022-02-24 DIAGNOSIS — Z3A.10 10 WEEKS GESTATION OF PREGNANCY: ICD-10-CM

## 2022-02-24 LAB
ABO GROUP BLD: NORMAL
BASOPHILS # BLD AUTO: 0.03 THOUSANDS/ΜL (ref 0–0.1)
BASOPHILS NFR BLD AUTO: 0 % (ref 0–1)
BLD GP AB SCN SERPL QL: NEGATIVE
EOSINOPHIL # BLD AUTO: 0.05 THOUSAND/ΜL (ref 0–0.61)
EOSINOPHIL NFR BLD AUTO: 1 % (ref 0–6)
ERYTHROCYTE [DISTWIDTH] IN BLOOD BY AUTOMATED COUNT: 13.6 % (ref 11.6–15.1)
HBV SURFACE AG SER QL: NORMAL
HCT VFR BLD AUTO: 38.9 % (ref 34.8–46.1)
HCV AB SER QL: NORMAL
HGB BLD-MCNC: 13.2 G/DL (ref 11.5–15.4)
IMM GRANULOCYTES # BLD AUTO: 0.03 THOUSAND/UL (ref 0–0.2)
IMM GRANULOCYTES NFR BLD AUTO: 0 % (ref 0–2)
LYMPHOCYTES # BLD AUTO: 2.51 THOUSANDS/ΜL (ref 0.6–4.47)
LYMPHOCYTES NFR BLD AUTO: 32 % (ref 14–44)
MCH RBC QN AUTO: 29.8 PG (ref 26.8–34.3)
MCHC RBC AUTO-ENTMCNC: 33.9 G/DL (ref 31.4–37.4)
MCV RBC AUTO: 88 FL (ref 82–98)
MONOCYTES # BLD AUTO: 0.55 THOUSAND/ΜL (ref 0.17–1.22)
MONOCYTES NFR BLD AUTO: 7 % (ref 4–12)
NEUTROPHILS # BLD AUTO: 4.58 THOUSANDS/ΜL (ref 1.85–7.62)
NEUTS SEG NFR BLD AUTO: 60 % (ref 43–75)
NRBC BLD AUTO-RTO: 0 /100 WBCS
PLATELET # BLD AUTO: 186 THOUSANDS/UL (ref 149–390)
PMV BLD AUTO: 13.1 FL (ref 8.9–12.7)
RBC # BLD AUTO: 4.43 MILLION/UL (ref 3.81–5.12)
RH BLD: POSITIVE
RUBV IGG SERPL IA-ACNC: >175 IU/ML
SPECIMEN EXPIRATION DATE: NORMAL
WBC # BLD AUTO: 7.75 THOUSAND/UL (ref 4.31–10.16)

## 2022-02-24 PROCEDURE — 36415 COLL VENOUS BLD VENIPUNCTURE: CPT

## 2022-02-24 PROCEDURE — 81220 CFTR GENE COM VARIANTS: CPT

## 2022-02-24 PROCEDURE — 86787 VARICELLA-ZOSTER ANTIBODY: CPT

## 2022-02-24 PROCEDURE — 86803 HEPATITIS C AB TEST: CPT

## 2022-02-24 PROCEDURE — 80081 OBSTETRIC PANEL INC HIV TSTG: CPT

## 2022-02-24 PROCEDURE — 87086 URINE CULTURE/COLONY COUNT: CPT

## 2022-02-25 LAB
BACTERIA UR CULT: NORMAL
HIV 1+2 AB+HIV1 P24 AG SERPL QL IA: NORMAL
RPR SER QL: NORMAL
VZV IGG SER IA-ACNC: NORMAL

## 2022-03-08 LAB
CF COMMENT: NORMAL
CFTR MUT ANL BLD/T: NORMAL

## 2022-03-10 ENCOUNTER — ROUTINE PRENATAL (OUTPATIENT)
Dept: OBGYN CLINIC | Facility: CLINIC | Age: 27
End: 2022-03-10

## 2022-03-10 VITALS — BODY MASS INDEX: 34.61 KG/M2 | SYSTOLIC BLOOD PRESSURE: 118 MMHG | WEIGHT: 208 LBS | DIASTOLIC BLOOD PRESSURE: 64 MMHG

## 2022-03-10 DIAGNOSIS — Z3A.12 12 WEEKS GESTATION OF PREGNANCY: Primary | ICD-10-CM

## 2022-03-10 DIAGNOSIS — O99.210 OBESITY IN PREGNANCY, ANTEPARTUM: ICD-10-CM

## 2022-03-10 DIAGNOSIS — Z12.4 ENCOUNTER FOR SCREENING FOR MALIGNANT NEOPLASM OF CERVIX: ICD-10-CM

## 2022-03-10 PROCEDURE — PNV: Performed by: OBSTETRICS & GYNECOLOGY

## 2022-03-10 PROCEDURE — 87491 CHLMYD TRACH DNA AMP PROBE: CPT | Performed by: OBSTETRICS & GYNECOLOGY

## 2022-03-10 PROCEDURE — 87591 N.GONORRHOEAE DNA AMP PROB: CPT | Performed by: OBSTETRICS & GYNECOLOGY

## 2022-03-10 NOTE — PROGRESS NOTES
27 y o  Mary Ibrahim female at [address] [place] [address] (Estimated Date of Delivery: 22) for PNV  Pre- Vitals      Most Recent Value   Prenatal Assessment    Fetal Heart Rate 165   Prenatal Vitals    Blood Pressure 118/64   Weight - Scale 94 3 kg (208 lb)   Urine Albumin/Glucose    Dilation/Effacement/Station    Vaginal Drainage    Edema    LLE Edema None   RLE Edema None   Facial Edema None        TW kg (0 lb)    Patient presents for prenatal H&P  Feeling well and has minimal complaints  Denies vaginal bleeding and abdominal cramping  Tolerating oral intake  Reports some food aversions but only mild nausea  OB hx reviewed  - [facility]  scheduled for 3/18/22  Patient interested in genetic screening  Anticipating electing sequential screening  Last pap  manda  GC/Chlamydia collected  Reviewed vaccinations recommended in pregnancy, namely influenza, TDAP, and COVID vaccines  Patient oriented to practice  Reviewed 5 female providers and 2 advanced practitioners all of whom participate in prenatal care  Reviewed recommendations for weight gain in pregnancy  Recommended to limit total weight gain in pregnancy to 11-20lbs  Encouraged moderate intensity exercise throughout pregnancy  Follow up in 4 weeks

## 2022-03-10 NOTE — PATIENT INSTRUCTIONS
Pregnancy at 11 to 14 Weeks   AMBULATORY CARE:   Changes happening to your body: You are now at the end of your first trimester and entering your second trimester  Morning sickness usually goes away by this time  You may have other symptoms such as fatigue, frequent urination, and headaches  You may have gained 2 to 4 pounds by now  Seek care immediately if:   · You have pain or cramping in your abdomen or low back  · You have heavy vaginal bleeding or clotting  · You pass material that looks like tissue or large clots  Collect the material and bring it with you  Call your doctor or obstetrician if:   · You cannot keep food or drinks down, and you are losing weight  · You have light vaginal bleeding  · You have chills or a fever  · You have vaginal itching, burning, or pain  · You have yellow, green, white, or foul-smelling vaginal discharge  · You have pain or burning when you urinate, less urine than usual, or pink or bloody urine  · You have questions or concerns about your condition or care  How to care for yourself at this stage of your pregnancy:       · Get plenty of rest   You may feel more tired than normal  You may need to take naps or go to bed earlier  · Manage nausea and vomiting  Avoid fatty and spicy foods  Eat small meals throughout the day instead of large meals  Marta may help to decrease nausea  Ask your healthcare provider about other ways of decreasing nausea and vomiting  · Eat a variety of healthy foods  Healthy foods include fruits, vegetables, whole-grain breads, low-fat dairy foods, beans, lean meats, and fish  Drink liquids as directed  Ask how much liquid to drink each day and which liquids are best for you  Limit caffeine to less than 200 milligrams each day  Limit your intake of fish to 2 servings each week  Choose fish low in mercury such as canned light tuna, shrimp, salmon, cod, or tilapia   Do not  eat fish high in mercury such as swordfish, tilefish, ton mackerel, and shark  · Take prenatal vitamins as directed  Your need for certain vitamins and minerals, such as folic acid, increases during pregnancy  Prenatal vitamins provide some of the extra vitamins and minerals you need  Prenatal vitamins may also help to decrease the risk of certain birth defects  · Do not smoke  Smoking increases your risk of a miscarriage and other health problems during your pregnancy  Smoking can cause your baby to be born too early or weigh less at birth  Ask your healthcare provider for information if you need help quitting  · Do not drink alcohol  Alcohol passes from your body to your baby through the placenta  It can affect your baby's brain development and cause fetal alcohol syndrome (FAS)  FAS is a group of conditions that causes mental, behavior, and growth problems  · Talk to your healthcare provider before you take any medicines  Many medicines may harm your baby if you take them when you are pregnant  Do not take any medicines, vitamins, herbs, or supplements without first talking to your healthcare provider  Never use illegal or street drugs (such as marijuana or cocaine) while you are pregnant  Safety tips during pregnancy:   · Avoid hot tubs and saunas  Do not use a hot tub or sauna while you are pregnant, especially during your first trimester  Hot tubs and saunas may raise your baby's temperature and increase the risk of birth defects  · Avoid toxoplasmosis  This is an infection caused by eating raw meat or being around infected cat feces  It can cause birth defects, miscarriages, and other problems  Wash your hands after you touch raw meat  Make sure any meat is well-cooked before you eat it  Avoid raw eggs and unpasteurized milk  Use gloves or ask someone else to clean your cat's litter box while you are pregnant  Changes happening with your baby: Your baby has fully formed fingernails and toenails   Your baby's heartbeat can now be heard  Ask your healthcare provider if you can listen to your baby's heartbeat  By week 14, your baby is over 4 inches long from the top of the head to the rump (baby's bottom)  Your baby weighs over 3 ounces  Prenatal care:  Prenatal care is a series of visits with your healthcare provider throughout your pregnancy  During the first 28 weeks of your pregnancy, you will see your healthcare provider 1 time each month  Prenatal care can help prevent problems during pregnancy and childbirth  Your healthcare provider will check your blood pressure and weight  Your baby's heart rate will also be checked  You may also need the following at some visits:  · A pelvic exam  allows your healthcare provider to see your cervix (the bottom part of your uterus)  Your healthcare provider will use a speculum to open your vagina  He or she will check the size and shape of your uterus  · Blood tests  may be done to check for any of the following:    ? Gestational diabetes or anemia (low iron level)    ? Blood type or Rh factor, or certain birth defects    ? Immunity to certain diseases, such as chickenpox or rubella    ? An infection, such as a sexually transmitted infection, HIV, or hepatitis B    · Hepatitis B  may need to be prevented or treated  Hepatitis B is inflammation of the liver caused by the hepatitis B virus (HBV)  HBV can spread from a mother to her baby during delivery  You will be checked for HBV as early as possible in the first trimester of each pregnancy  You need the test even if you received the hepatitis B vaccine or were tested before  You may need to have an HBV infection treated before you give birth  · Urine tests  may also be done to check for sugar and protein  These can be signs of gestational diabetes or preeclampsia  Urine tests may also be done to check for signs of infection  · A fetal ultrasound  shows pictures of your baby inside your uterus   The pictures are used to check your baby's development, movement, and position  · Genetic disorder screening tests  may be offered to you  These tests check your baby's risk for genetic disorders such as Down syndrome  A screening test includes a blood test and ultrasound  Follow up with your doctor or obstetrician as directed:  Go to all prenatal visits  Write down your questions so you remember to ask them during your visits  © Copyright [employer] 2022 Information is for End User's use only and may not be sold, redistributed or otherwise used for commercial purposes  All illustrations and images included in CareNotes® are the copyrighted property of A D A M , Inc  or [address] [place], [place] [address]alonzo   The above information is an  only  It is not intended as medical advice for individual conditions or treatments  Talk to your doctor, nurse or pharmacist before following any medical regimen to see if it is safe and effective for you

## 2022-03-10 NOTE — PROGRESS NOTES
Pt is here for her initial prenatal visit  She is due for [facility] testing; her pap is up to date  Pt is doing well

## 2022-03-15 LAB
C TRACH DNA SPEC QL NAA+PROBE: NEGATIVE
N GONORRHOEA DNA SPEC QL NAA+PROBE: NEGATIVE

## 2022-03-18 ENCOUNTER — ROUTINE PRENATAL (OUTPATIENT)
Dept: PERINATAL CARE | Facility: CLINIC | Age: 27
End: 2022-03-18
Payer: COMMERCIAL

## 2022-03-18 VITALS
HEIGHT: 65 IN | BODY MASS INDEX: 35.06 KG/M2 | HEART RATE: 77 BPM | SYSTOLIC BLOOD PRESSURE: 116 MMHG | DIASTOLIC BLOOD PRESSURE: 57 MMHG | WEIGHT: 210.4 LBS

## 2022-03-18 DIAGNOSIS — Z3A.13 13 WEEKS GESTATION OF PREGNANCY: Primary | ICD-10-CM

## 2022-03-18 DIAGNOSIS — E66.9 OBESITY (BMI 30.0-34.9): ICD-10-CM

## 2022-03-18 DIAGNOSIS — F41.9 ANXIETY: ICD-10-CM

## 2022-03-18 DIAGNOSIS — Z36.82 NUCHAL TRANSLUCENCY OF FETUS ON PRENATAL ULTRASOUND: ICD-10-CM

## 2022-03-18 DIAGNOSIS — Z13.79 GENETIC SCREENING: ICD-10-CM

## 2022-03-18 PROCEDURE — 76813 OB US NUCHAL MEAS 1 GEST: CPT | Performed by: OBSTETRICS & GYNECOLOGY

## 2022-03-18 PROCEDURE — 99241 PR OFFICE CONSULTATION NEW/ESTAB PATIENT 15 MIN: CPT | Performed by: OBSTETRICS & GYNECOLOGY

## 2022-03-18 NOTE — LETTER
March 22, 2022     DO Faby Ly [address]  [address]  [facility]    Patient: Chip Sanchez   YOB: 1995   Date of Visit: 3/18/2022       Dear Dr Edel Felix:    Thank you for referring Maykel Monreal to me for evaluation  Below are my notes for this consultation  If you have questions, please do not hesitate to call me  I look forward to following your patient along with you  Sincerely,        Huong Ramirez MD        CC: No Recipients  Huong Ramirez MD  3/22/2022  7:45 PM  Incomplete  A fetal ultrasound was completed  See Ob procedures in Epic for an interpretation and recommendations  Do not hesitate to contact us in [facility] if you have questions  Jesus Huitron MD, [address] [facility]  Maternal Fetal Medicine

## 2022-03-18 NOTE — PROGRESS NOTES
Patient chose to have Part 1 Sequential Screening from [employer]  Finger stick specimen collected from right INDEX finger and patient tolerated procedure well  Sample mailed to [employer] via [employer]  Explained results will be available in 7-10 business days  [facility] office will call her with results or she can view in [address]  [place] collection for Part 2 is between 16-18 weeks gestation, a lab slip and instruction letter will be mailed from our office  Patient instructed to take the paper lab slip to lab, this specialty genetic test is not yet in Epic  Patient verbalized understanding of all instructions

## 2022-03-22 PROBLEM — Z3A.13 13 WEEKS GESTATION OF PREGNANCY: Status: ACTIVE | Noted: 2022-03-10

## 2022-03-22 PROBLEM — E66.9 OBESITY (BMI 30.0-34.9): Status: ACTIVE | Noted: 2022-03-22

## 2022-03-22 NOTE — PROGRESS NOTES
A fetal ultrasound was completed  See Ob procedures in Epic for an interpretation and recommendations  Do not hesitate to contact us in [facility] if you have questions  Lula Scott MD, [address] [facility]  Maternal Fetal Medicine

## 2022-03-28 ENCOUNTER — TELEPHONE (OUTPATIENT)
Dept: PERINATAL CARE | Facility: CLINIC | Age: 27
End: 2022-03-28

## 2022-03-28 NOTE — TELEPHONE ENCOUNTER
Left voice mail message and provided   result of [facility] Part 1 Sequential Screen given and Part 2 instructions explained  Instructed pt  to call MFM nurse line for any questions, number provided  #[phone]  TRF and lab instruction letter mailed

## 2022-03-28 NOTE — TELEPHONE ENCOUNTER
----- Message from Reggie Hurst MD sent at 3/26/2022  1:07 PM EDT -----  I have reviewed the patient's lab results which are normal   Please contact the patient to inform her of the normal results if Ms Minnie Jimenez has not reviewed them using Rishi Whitten MD

## 2022-03-28 NOTE — LETTER
03/28/22  Raphael Resendiz  1995    Thank you for completing Part 1 of your Sequential Screen  To obtain a complete test result, complete blood work for Part 2 Sequential Screen between the weeks of 04/06/22  to 04/20/22  Please verify which laboratory is In Network with your insurance plan ([facility] lab or [employer])      If you choose to use a [facility] lab, please go to a location from this list:     [address] Rivero [address]  [address] [facility], [facility] [address]                [facility] 5, [facility] 425 [facility]  300 [facility], [facility] [address]                    [facility] 19, [facility] 80 [facility]  [address], [address]             [address], [address]  [address], [facility] [address]             819 [facility] [address]  1430 [facility], 119 [facility] [address] [place] [address] Drive 8400 [facility]  [address], [facility] [address]                        [address], [facility] [address] [facility]  1401 [facility] [address]            207 [facility], [facility] 969 [facility]                            [address], [facility] [address]    For list of [facility] locations [employer]   If you choose [facility], please remind the phlebotomist the screen is ordered for 5 [facility]  You can take this letter with you to the lab  Call Maternal Fetal Medicine nurse line any questions at [phone]     Thank you,  [facility] Maternal Fetal Medicine Staff

## 2022-04-05 ENCOUNTER — ROUTINE PRENATAL (OUTPATIENT)
Dept: OBGYN CLINIC | Facility: CLINIC | Age: 27
End: 2022-04-05

## 2022-04-05 VITALS — DIASTOLIC BLOOD PRESSURE: 72 MMHG | WEIGHT: 208 LBS | SYSTOLIC BLOOD PRESSURE: 114 MMHG | BODY MASS INDEX: 34.61 KG/M2

## 2022-04-05 DIAGNOSIS — Z34.82 ENCOUNTER FOR SUPERVISION OF OTHER NORMAL PREGNANCY, SECOND TRIMESTER: Primary | ICD-10-CM

## 2022-04-05 DIAGNOSIS — E66.9 OBESITY (BMI 30.0-34.9): ICD-10-CM

## 2022-04-05 DIAGNOSIS — Z3A.16 16 WEEKS GESTATION OF PREGNANCY: ICD-10-CM

## 2022-04-05 PROCEDURE — PNV: Performed by: OBSTETRICS & GYNECOLOGY

## 2022-04-05 NOTE — PROGRESS NOTES
27 y o    female at 12 4 wga EGA for PNV  BP : 114/72  Feeling well    No complaints  Reminded pt to second part of seq screen  Has level 2 scheduled  Reviewed PTL precautions  F/u 4 weeks

## 2022-04-12 ENCOUNTER — APPOINTMENT (OUTPATIENT)
Dept: LAB | Facility: HOSPITAL | Age: 27
End: 2022-04-12
Attending: OBSTETRICS & GYNECOLOGY
Payer: COMMERCIAL

## 2022-04-12 ENCOUNTER — TRANSCRIBE ORDERS (OUTPATIENT)
Dept: LAB | Facility: HOSPITAL | Age: 27
End: 2022-04-12

## 2022-04-12 DIAGNOSIS — Z33.1 PREGNANT STATE, INCIDENTAL: Primary | ICD-10-CM

## 2022-04-12 DIAGNOSIS — Z36.9 UNSPECIFIED ANTENATAL SCREENING: ICD-10-CM

## 2022-04-12 DIAGNOSIS — Z33.1 PREGNANT STATE, INCIDENTAL: ICD-10-CM

## 2022-04-12 PROCEDURE — 36415 COLL VENOUS BLD VENIPUNCTURE: CPT

## 2022-04-13 LAB — SCAN RESULT: NORMAL

## 2022-04-21 ENCOUNTER — APPOINTMENT (OUTPATIENT)
Dept: LAB | Facility: CLINIC | Age: 27
End: 2022-04-21

## 2022-04-21 DIAGNOSIS — Z00.8 ENCOUNTER FOR OTHER GENERAL EXAMINATION: ICD-10-CM

## 2022-04-21 LAB
CHOLEST SERPL-MCNC: 207 MG/DL
EST. AVERAGE GLUCOSE BLD GHB EST-MCNC: 91 MG/DL
HBA1C MFR BLD: 4.8 %
HDLC SERPL-MCNC: 77 MG/DL
LDLC SERPL CALC-MCNC: 112 MG/DL (ref 0–100)
NONHDLC SERPL-MCNC: 130 MG/DL
TRIGL SERPL-MCNC: 91 MG/DL

## 2022-04-21 PROCEDURE — 80061 LIPID PANEL: CPT

## 2022-04-21 PROCEDURE — 83036 HEMOGLOBIN GLYCOSYLATED A1C: CPT

## 2022-04-21 PROCEDURE — 36415 COLL VENOUS BLD VENIPUNCTURE: CPT

## 2022-05-05 ENCOUNTER — ROUTINE PRENATAL (OUTPATIENT)
Dept: OBGYN CLINIC | Facility: CLINIC | Age: 27
End: 2022-05-05

## 2022-05-05 VITALS — DIASTOLIC BLOOD PRESSURE: 70 MMHG | BODY MASS INDEX: 35.11 KG/M2 | SYSTOLIC BLOOD PRESSURE: 112 MMHG | WEIGHT: 211 LBS

## 2022-05-05 DIAGNOSIS — Z34.02 ENCOUNTER FOR SUPERVISION OF NORMAL FIRST PREGNANCY, SECOND TRIMESTER: Primary | ICD-10-CM

## 2022-05-05 DIAGNOSIS — Z3A.20 20 WEEKS GESTATION OF PREGNANCY: ICD-10-CM

## 2022-05-05 PROCEDURE — PNV: Performed by: OBSTETRICS & GYNECOLOGY

## 2022-05-05 NOTE — PROGRESS NOTES
This is a 32 y o  Durga Margaret at 20w3d who presents for return OB visit  No complaints  Starting to feel flutters  BP: 112/70 TWG: 3lb    Normal seq screen   Level 2 US scheduled for next week  F/up 4 wks

## 2022-05-12 ENCOUNTER — ROUTINE PRENATAL (OUTPATIENT)
Dept: PERINATAL CARE | Facility: CLINIC | Age: 27
End: 2022-05-12
Payer: COMMERCIAL

## 2022-05-12 VITALS
DIASTOLIC BLOOD PRESSURE: 66 MMHG | HEIGHT: 66 IN | BODY MASS INDEX: 34.27 KG/M2 | WEIGHT: 213.2 LBS | HEART RATE: 91 BPM | SYSTOLIC BLOOD PRESSURE: 126 MMHG

## 2022-05-12 DIAGNOSIS — Z3A.20 20 WEEKS GESTATION OF PREGNANCY: ICD-10-CM

## 2022-05-12 DIAGNOSIS — Z36.86 ENCOUNTER FOR ANTENATAL SCREENING FOR CERVICAL LENGTH: ICD-10-CM

## 2022-05-12 DIAGNOSIS — Z34.02 ENCOUNTER FOR SUPERVISION OF NORMAL FIRST PREGNANCY, SECOND TRIMESTER: ICD-10-CM

## 2022-05-12 DIAGNOSIS — O99.212 OBESITY DURING PREGNANCY IN SECOND TRIMESTER: Primary | ICD-10-CM

## 2022-05-12 PROBLEM — U07.1 COVID-19 VIRUS INFECTION: Status: RESOLVED | Noted: 2020-11-05 | Resolved: 2022-05-12

## 2022-05-12 PROCEDURE — 76811 OB US DETAILED SNGL FETUS: CPT | Performed by: OBSTETRICS & GYNECOLOGY

## 2022-05-12 PROCEDURE — 76817 TRANSVAGINAL US OBSTETRIC: CPT | Performed by: OBSTETRICS & GYNECOLOGY

## 2022-05-12 PROCEDURE — 99213 OFFICE O/P EST LOW 20 MIN: CPT | Performed by: OBSTETRICS & GYNECOLOGY

## 2022-05-12 NOTE — PROGRESS NOTES
Ultrasound Probe Disinfection    A transvaginal ultrasound was performed  Prior to use, disinfection was performed with High Level Disinfection Process (Trophon)  Probe serial number [id number]: [id number] was used        Jj Ingram  05/12/22  1:51 PM

## 2022-05-12 NOTE — PROGRESS NOTES
Kathleen Eldridge  has no complaints today at 21w3d  She reports fetal movements and does not report any vaginal bleeding or signs of labor  Her recently completed fetal testing revealed a normal sequential screen  Hemoglobin A1c was normal at 4 8 and her cystic fibrosis screen was negative  She has not completed a MSAFP  Problem list:  1  Elevated pre gravid BMI of 34    Ultrasound findings: The ultrasound today shows normal interval fetal growth and fluid, normal cervical length, and no malformations were detected  We were unable to complete the anatomical survey secondary to fetal position  Pregnancy ultrasound has limitations and is unable to detect all forms of fetal congenital abnormalities  Follow up recommended:   1  Recommend a follow-up ultrasound for growth and missed anatomy at 32 weeks  Pre visit time reviewing her records   5 minutes  Face to face time 5 minutes  Post visit time on documentation of note, updating her problem list, adding orders and prescriptions 5 minutes  Procedures that were completed today were charged separately  The level of decision making was straight forward      Kiana Slater MD

## 2022-05-12 NOTE — LETTER
May 12, 2022     Barbara Riggs, [address] [facility]  [address] [place] [address]    Patient: Deysi Uriostegui   YOB: 1995   Date of Visit: 5/12/2022       Dear Dr Love Inch:    Thank you for referring Franck Romero to me for evaluation  Below are my notes for this consultation  If you have questions, please do not hesitate to call me  I look forward to following your patient along with you  Sincerely,        Yary Ivey MD        CC: No Recipients  Yary Ivey MD  5/12/2022  4:22 PM  Sign when Signing Visit  Deysi Uriostegui  has no complaints today at 21w3d  She reports fetal movements and does not report any vaginal bleeding or signs of labor  Her recently completed fetal testing revealed a normal sequential screen  Hemoglobin A1c was normal at 4 8 and her cystic fibrosis screen was negative  She has not completed a MSAFP  Problem list:  1  Elevated pre gravid BMI of 34    Ultrasound findings: The ultrasound today shows normal interval fetal growth and fluid, normal cervical length, and no malformations were detected  We were unable to complete the anatomical survey secondary to fetal position  Pregnancy ultrasound has limitations and is unable to detect all forms of fetal congenital abnormalities  Follow up recommended:   1  Recommend a follow-up ultrasound for growth and missed anatomy at 32 weeks  Pre visit time reviewing her records   5 minutes  Face to face time 5 minutes  Post visit time on documentation of note, updating her problem list, adding orders and prescriptions 5 minutes  Procedures that were completed today were charged separately  The level of decision making was straight forward      Yary Ivey MD

## 2022-05-18 ENCOUNTER — TELEPHONE (OUTPATIENT)
Dept: PERINATAL CARE | Facility: CLINIC | Age: 27
End: 2022-05-18

## 2022-05-18 NOTE — TELEPHONE ENCOUNTER
Left patient a message that her MFM appointment had to be rescheduled  The new time, date and location were provided  The patient has been instructed to please call us back at [phone] with any questions or concerns

## 2022-06-07 ENCOUNTER — ROUTINE PRENATAL (OUTPATIENT)
Dept: OBGYN CLINIC | Facility: CLINIC | Age: 27
End: 2022-06-07

## 2022-06-07 VITALS — SYSTOLIC BLOOD PRESSURE: 112 MMHG | DIASTOLIC BLOOD PRESSURE: 60 MMHG | WEIGHT: 215 LBS | BODY MASS INDEX: 35.23 KG/M2

## 2022-06-07 DIAGNOSIS — Z3A.25 25 WEEKS GESTATION OF PREGNANCY: ICD-10-CM

## 2022-06-07 DIAGNOSIS — Z34.02 ENCOUNTER FOR SUPERVISION OF NORMAL FIRST PREGNANCY, SECOND TRIMESTER: Primary | ICD-10-CM

## 2022-06-07 PROCEDURE — PNV: Performed by: PHYSICIAN ASSISTANT

## 2022-06-07 RX ORDER — FAMOTIDINE 10 MG
10 TABLET ORAL 2 TIMES DAILY
COMMUNITY

## 2022-06-07 NOTE — PROGRESS NOTES
Patient is a 33 YO  female presenting to the office at 25 1 weeks for routine OB care     BP: 112/60  TWlb  Fetal Movement: yes good movement  Feeling well today  Denies LOF, CTX, VB  Has 32 week growth US scheduled  Reviewed precautions  Call for concerns  28 week lab slip given  RTO 2 weeks

## 2022-06-15 ENCOUNTER — APPOINTMENT (OUTPATIENT)
Dept: LAB | Facility: CLINIC | Age: 27
End: 2022-06-15
Payer: COMMERCIAL

## 2022-06-15 DIAGNOSIS — Z34.02 ENCOUNTER FOR SUPERVISION OF NORMAL FIRST PREGNANCY, SECOND TRIMESTER: ICD-10-CM

## 2022-06-15 DIAGNOSIS — Z3A.25 25 WEEKS GESTATION OF PREGNANCY: ICD-10-CM

## 2022-06-15 LAB
ERYTHROCYTE [DISTWIDTH] IN BLOOD BY AUTOMATED COUNT: 13.2 % (ref 11.6–15.1)
GLUCOSE 1H P 50 G GLC PO SERPL-MCNC: 114 MG/DL (ref 40–134)
HCT VFR BLD AUTO: 36.8 % (ref 34.8–46.1)
HGB BLD-MCNC: 12.3 G/DL (ref 11.5–15.4)
MCH RBC QN AUTO: 31.6 PG (ref 26.8–34.3)
MCHC RBC AUTO-ENTMCNC: 33.4 G/DL (ref 31.4–37.4)
MCV RBC AUTO: 95 FL (ref 82–98)
PLATELET # BLD AUTO: 162 THOUSANDS/UL (ref 149–390)
PMV BLD AUTO: 12.9 FL (ref 8.9–12.7)
RBC # BLD AUTO: 3.89 MILLION/UL (ref 3.81–5.12)
WBC # BLD AUTO: 8.2 THOUSAND/UL (ref 4.31–10.16)

## 2022-06-15 PROCEDURE — 36415 COLL VENOUS BLD VENIPUNCTURE: CPT

## 2022-06-15 PROCEDURE — 82950 GLUCOSE TEST: CPT

## 2022-06-15 PROCEDURE — 85027 COMPLETE CBC AUTOMATED: CPT

## 2022-07-05 ENCOUNTER — ROUTINE PRENATAL (OUTPATIENT)
Dept: OBGYN CLINIC | Facility: CLINIC | Age: 27
End: 2022-07-05
Payer: COMMERCIAL

## 2022-07-05 VITALS — SYSTOLIC BLOOD PRESSURE: 114 MMHG | BODY MASS INDEX: 35.76 KG/M2 | DIASTOLIC BLOOD PRESSURE: 72 MMHG | WEIGHT: 218.2 LBS

## 2022-07-05 DIAGNOSIS — Z34.03 ENCOUNTER FOR SUPERVISION OF NORMAL FIRST PREGNANCY IN THIRD TRIMESTER: Primary | ICD-10-CM

## 2022-07-05 DIAGNOSIS — Z23 NEED FOR TDAP VACCINATION: ICD-10-CM

## 2022-07-05 PROCEDURE — 90715 TDAP VACCINE 7 YRS/> IM: CPT | Performed by: OBSTETRICS & GYNECOLOGY

## 2022-07-05 PROCEDURE — PNV: Performed by: OBSTETRICS & GYNECOLOGY

## 2022-07-05 PROCEDURE — 90471 IMMUNIZATION ADMIN: CPT | Performed by: OBSTETRICS & GYNECOLOGY

## 2022-07-05 NOTE — PROGRESS NOTES
Patient states she is feeling well, no complaints   Breast pump ordered, Tdap given     Urine neg/neg

## 2022-07-05 NOTE — PROGRESS NOTES
This is a 32 y o  Garima Rodriguez at 29w1d who presents for return OB visit  No complaints  Denies contractions, leakage, bleeding  Endorses fetal movement  28 wk labs reviewed  TDAP offered   [address] reviewed  Consent signed  Full code   OK with blood transfusion   Reviewed labs   Has growth u/s scheduled

## 2022-07-21 ENCOUNTER — ROUTINE PRENATAL (OUTPATIENT)
Dept: OBGYN CLINIC | Facility: CLINIC | Age: 27
End: 2022-07-21

## 2022-07-21 VITALS
SYSTOLIC BLOOD PRESSURE: 118 MMHG | DIASTOLIC BLOOD PRESSURE: 66 MMHG | BODY MASS INDEX: 35.13 KG/M2 | HEIGHT: 66 IN | WEIGHT: 218.6 LBS

## 2022-07-21 DIAGNOSIS — Z34.03 ENCOUNTER FOR SUPERVISION OF NORMAL FIRST PREGNANCY IN THIRD TRIMESTER: Primary | ICD-10-CM

## 2022-07-21 DIAGNOSIS — Z3A.31 31 WEEKS GESTATION OF PREGNANCY: ICD-10-CM

## 2022-07-21 PROCEDURE — PNV: Performed by: PHYSICIAN ASSISTANT

## 2022-07-21 NOTE — PROGRESS NOTES
Patient is a 31 YO  female presenting to the office at 31 3 weeks for routine OB care     BP: 118/66  TWG: 10lb  Fetal Movement: yes good movement  Feeling well today  Denies LOF, CTX, VB  Reviewed labor precautions  Growth US next week  Call for concerns   RTO 2 weeks

## 2022-07-21 NOTE — PROGRESS NOTES
Pt feeling well, denies any VB or LOF, having good fetal movements, urine - negative protein, neg glucose

## 2022-07-28 ENCOUNTER — ULTRASOUND (OUTPATIENT)
Dept: PERINATAL CARE | Facility: CLINIC | Age: 27
End: 2022-07-28
Payer: COMMERCIAL

## 2022-07-28 VITALS
HEIGHT: 66 IN | BODY MASS INDEX: 35.36 KG/M2 | WEIGHT: 220 LBS | SYSTOLIC BLOOD PRESSURE: 123 MMHG | DIASTOLIC BLOOD PRESSURE: 73 MMHG | HEART RATE: 105 BPM

## 2022-07-28 DIAGNOSIS — O99.210 OBESITY IN PREGNANCY, ANTEPARTUM: Primary | ICD-10-CM

## 2022-07-28 DIAGNOSIS — Z36.2 ENCOUNTER FOR FOLLOW-UP ULTRASOUND OF FETAL ANATOMY: ICD-10-CM

## 2022-07-28 DIAGNOSIS — Z36.89 ENCOUNTER FOR ULTRASOUND TO ASSESS FETAL GROWTH: ICD-10-CM

## 2022-07-28 DIAGNOSIS — Z3A.32 32 WEEKS GESTATION OF PREGNANCY: ICD-10-CM

## 2022-07-28 PROCEDURE — 76816 OB US FOLLOW-UP PER FETUS: CPT | Performed by: OBSTETRICS & GYNECOLOGY

## 2022-07-28 PROCEDURE — 99212 OFFICE O/P EST SF 10 MIN: CPT | Performed by: NURSE PRACTITIONER

## 2022-07-28 NOTE — PROGRESS NOTES
Agatha Ulloa has no complaints today  She reports regular fetal movements and does not report any problems  She is here today at [address] or an ultrasound for fetal growth and missed anatomy  Her 1 hour glucola screen on 6/15 was 114  Problem list:  1  Obesity     Ultrasound findings: The ultrasound today shows normal interval fetal growth and fluid  Growth is at the 39th percentile  The anatomy scan is now complete except for PCI which was seen at her NT scan  Pregnancy ultrasound has limitations and is unable to detect all forms of fetal congenital abnormalities  The inaccuracy in the EFW can be off by 1 lb either way in the third trimester  Follow up recommended:  No further ultrasounds are recommended at this time  Fetal kick counting was recommended  Routine prenatal care is advised  Please refer her back to our office as clinically indicated  Pre visit time reviewing her records 4 minutes  Face to face time 5 minutes  Post visit time on documentation of note, updating her problem list, adding orders and prescriptions 4 minutes  Procedures that were completed today were charged separately  The level of decision making was low complexity     Maren LYMAN    I supervised the Advanced Practitioner  I discussed the case with the Advanced Practitioner and reviewed the AP note, ultrasound pictures, prescribed medications, and orders placed    Vivek Alonzo MD

## 2022-07-28 NOTE — LETTER
July 28, 2022     Glendy Amaya, [address] [facility]  [address]  [place] [address]    Patient: Rupert Miranda   YOB: 1995   Date of Visit: 7/28/2022       Dear Dr Nahed Leonard:    Thank you for referring Virgie Escalera to me for evaluation  Below are my notes for this consultation  If you have questions, please do not hesitate to call me  I look forward to following your patient along with you  Sincerely,        Kenn Rowell MD        CC: No Recipients  Kenn Rowell MD  7/28/2022  6:29 PM  Sign when Signing Visit  Virgie Escalera has no complaints today  She reports regular fetal movements and does not report any problems  She is here today at [address] [facility] or an ultrasound for fetal growth and missed anatomy  Her 1 hour glucola screen on 6/15 was 114  Problem list:  1  Obesity     Ultrasound findings: The ultrasound today shows normal interval fetal growth and fluid  Growth is at the 39th percentile  The anatomy scan is now complete except for PCI which was seen at her NT scan  Pregnancy ultrasound has limitations and is unable to detect all forms of fetal congenital abnormalities  The inaccuracy in the EFW can be off by 1 lb either way in the third trimester  Follow up recommended:  No further ultrasounds are recommended at this time  Fetal kick counting was recommended  Routine prenatal care is advised  Please refer her back to our office as clinically indicated  Pre visit time reviewing her records 4 minutes  Face to face time 5 minutes  Post visit time on documentation of note, updating her problem list, adding orders and prescriptions 4 minutes  Procedures that were completed today were charged separately  The level of decision making was low complexity     Louie LYMAN    I supervised the Advanced Practitioner  I discussed the case with the Advanced Practitioner and reviewed the AP note, ultrasound pictures, prescribed medications, and orders placed    Tino Haider MD [place]

## 2022-07-28 NOTE — PATIENT INSTRUCTIONS
Thank you for choosing us for your  care today  If you have any questions about your ultrasound or care, please do not hesitate to contact us or your primary obstetrician  Some general instructions for your pregnancy are:    Protect against coronavirus: get vaccinated - pregnant women are increased risk of severe COVID  Notify your primary care doctor if you have any symptoms  Exercise: Aim for 22 minutes per day (150 minutes per week) of regular exercise  Walking is great! Nutrition: aim for calcium-rich and iron-rich foods as well as healthy sources of protein  Learn about Preeclampsia: preeclampsia is a common, serious high blood pressure complication in pregnancy  A blood pressure of 380SRLO (systolic or top number) or 19GYRN (diastolic or bottom number) is not normal and needs evaluation by your doctor  Aspirin is sometimes prescribed in early pregnancy to prevent preeclampsia in women with risk factors - ask your obstetrician if you should be on this medication  If you smoke, try to reduce how many cigarettes you smoke or try to quit completely  Do not vape  Other warning signs to watch out for in pregnancy or postpartum: chest pain, obstructed breathing or shortness of breath, seizures, thoughts of hurting yourself or your baby, bleeding, a painful or swollen leg, fever, or headache (see [employer]WHON[facility] POST-BIRTH Warning Signs campaign)  If these happen call 911  Itching is also not normal in pregnancy and if you experience this, especially over your hands and feet, potentially worse at night, notify your doctors

## 2022-08-02 ENCOUNTER — ROUTINE PRENATAL (OUTPATIENT)
Dept: OBGYN CLINIC | Facility: CLINIC | Age: 27
End: 2022-08-02

## 2022-08-02 VITALS — DIASTOLIC BLOOD PRESSURE: 62 MMHG | SYSTOLIC BLOOD PRESSURE: 114 MMHG | BODY MASS INDEX: 35.89 KG/M2 | WEIGHT: 219 LBS

## 2022-08-02 DIAGNOSIS — Z3A.33 33 WEEKS GESTATION OF PREGNANCY: Primary | ICD-10-CM

## 2022-08-02 DIAGNOSIS — Z34.03 ENCOUNTER FOR SUPERVISION OF NORMAL FIRST PREGNANCY IN THIRD TRIMESTER: ICD-10-CM

## 2022-08-02 PROCEDURE — PNV: Performed by: OBSTETRICS & GYNECOLOGY

## 2022-08-02 NOTE — PROGRESS NOTES
32 y o  Navin Fitzgerald female at 33w1d (Estimated Date of Delivery: 22) for PNV  Pre- Vitals    Flowsheet Row Most Recent Value   Prenatal Assessment    Fetal Heart Rate 160   Movement Present   Prenatal Vitals    Blood Pressure 114/62   Weight - Scale 99 3 kg (219 lb)   Urine Albumin/Glucose    Dilation/Effacement/Station    Vaginal Drainage    Edema    LLE Edema Trace   RLE Edema Trace   Facial Edema None        TW 99 kg (11 lb)  32w growth 39%, 4#5 - no further US scheduled  She had one contraction  Birth plan reviewed, open to epidural but may try without  Reviewed fetal monitoring during labor  Patient denies leakage of fluid or vaginal bleeding  She reports good fetal movement  She has no other concerns today  Return to office in 2 weeks

## 2022-08-15 LAB
DME PARACHUTE DELIVERY DATE ACTUAL: NORMAL
DME PARACHUTE DELIVERY DATE EXPECTED: NORMAL
DME PARACHUTE DELIVERY DATE REQUESTED: NORMAL
DME PARACHUTE ITEM DESCRIPTION: NORMAL
DME PARACHUTE ORDER STATUS: NORMAL
DME PARACHUTE SUPPLIER NAME: NORMAL
DME PARACHUTE SUPPLIER PHONE: NORMAL

## 2022-08-18 ENCOUNTER — ROUTINE PRENATAL (OUTPATIENT)
Dept: OBGYN CLINIC | Facility: CLINIC | Age: 27
End: 2022-08-18

## 2022-08-18 VITALS
HEART RATE: 84 BPM | DIASTOLIC BLOOD PRESSURE: 84 MMHG | SYSTOLIC BLOOD PRESSURE: 132 MMHG | WEIGHT: 223 LBS | OXYGEN SATURATION: 97 % | BODY MASS INDEX: 36.54 KG/M2

## 2022-08-18 DIAGNOSIS — Z34.03 ENCOUNTER FOR SUPERVISION OF NORMAL FIRST PREGNANCY IN THIRD TRIMESTER: Primary | ICD-10-CM

## 2022-08-18 DIAGNOSIS — Z3A.35 35 WEEKS GESTATION OF PREGNANCY: ICD-10-CM

## 2022-08-18 PROCEDURE — 87150 DNA/RNA AMPLIFIED PROBE: CPT | Performed by: OBSTETRICS & GYNECOLOGY

## 2022-08-18 PROCEDURE — 87591 N.GONORRHOEAE DNA AMP PROB: CPT | Performed by: OBSTETRICS & GYNECOLOGY

## 2022-08-18 PROCEDURE — 87491 CHLMYD TRACH DNA AMP PROBE: CPT | Performed by: OBSTETRICS & GYNECOLOGY

## 2022-08-18 PROCEDURE — PNV: Performed by: OBSTETRICS & GYNECOLOGY

## 2022-08-18 NOTE — PROGRESS NOTES
This is a 32 y o   at 35w3d who presents for return OB visit  No complaints  Denies contractions, leakage, bleeding  Endorses fetal movement  BP: 132/84 TWG: 15lb    GBS, GC/CT done: Yes  PCN allergy: No  SVE: FT/50/-3  Labor precautions given  [address] reviewed     F/up 1 wk

## 2022-08-18 NOTE — PROGRESS NOTES
Pt doing well today, has swelling of feet/legs  Denies any vaginal bleeding/leakage  GBS/GC-Ch collected today  Urine dip neg/neg

## 2022-08-19 LAB
C TRACH DNA SPEC QL NAA+PROBE: NEGATIVE
N GONORRHOEA DNA SPEC QL NAA+PROBE: NEGATIVE

## 2022-08-20 LAB — GP B STREP DNA SPEC QL NAA+PROBE: POSITIVE

## 2022-08-23 ENCOUNTER — TELEPHONE (OUTPATIENT)
Dept: OBGYN CLINIC | Facility: CLINIC | Age: 27
End: 2022-08-23

## 2022-08-23 ENCOUNTER — APPOINTMENT (OUTPATIENT)
Dept: LAB | Facility: MEDICAL CENTER | Age: 27
End: 2022-08-23
Payer: COMMERCIAL

## 2022-08-23 DIAGNOSIS — Z34.02 ENCOUNTER FOR SUPERVISION OF NORMAL FIRST PREGNANCY, SECOND TRIMESTER: ICD-10-CM

## 2022-08-23 DIAGNOSIS — Z3A.36 36 WEEKS GESTATION OF PREGNANCY: ICD-10-CM

## 2022-08-23 DIAGNOSIS — R03.0 ELEVATED BLOOD PRESSURE READING: ICD-10-CM

## 2022-08-23 DIAGNOSIS — R03.0 ELEVATED BLOOD PRESSURE READING: Primary | ICD-10-CM

## 2022-08-23 DIAGNOSIS — Z3A.25 25 WEEKS GESTATION OF PREGNANCY: ICD-10-CM

## 2022-08-23 LAB
ALBUMIN SERPL BCP-MCNC: 3.1 G/DL (ref 3.5–5)
ALP SERPL-CCNC: 180 U/L (ref 46–116)
ALT SERPL W P-5'-P-CCNC: 15 U/L (ref 12–78)
ANION GAP SERPL CALCULATED.3IONS-SCNC: 7 MMOL/L (ref 4–13)
AST SERPL W P-5'-P-CCNC: 18 U/L (ref 5–45)
BASOPHILS # BLD AUTO: 0.02 THOUSANDS/ΜL (ref 0–0.1)
BASOPHILS NFR BLD AUTO: 0 % (ref 0–1)
BILIRUB SERPL-MCNC: 0.29 MG/DL (ref 0.2–1)
BUN SERPL-MCNC: 7 MG/DL (ref 5–25)
CALCIUM ALBUM COR SERPL-MCNC: 10.3 MG/DL (ref 8.3–10.1)
CALCIUM SERPL-MCNC: 9.6 MG/DL (ref 8.3–10.1)
CHLORIDE SERPL-SCNC: 105 MMOL/L (ref 96–108)
CO2 SERPL-SCNC: 24 MMOL/L (ref 21–32)
CREAT SERPL-MCNC: 0.66 MG/DL (ref 0.6–1.3)
CREAT UR-MCNC: <13 MG/DL
EOSINOPHIL # BLD AUTO: 0.04 THOUSAND/ΜL (ref 0–0.61)
EOSINOPHIL NFR BLD AUTO: 0 % (ref 0–6)
ERYTHROCYTE [DISTWIDTH] IN BLOOD BY AUTOMATED COUNT: 13 % (ref 11.6–15.1)
GFR SERPL CREATININE-BSD FRML MDRD: 119 ML/MIN/1.73SQ M
GLUCOSE SERPL-MCNC: 79 MG/DL (ref 65–140)
HCT VFR BLD AUTO: 38.6 % (ref 34.8–46.1)
HGB BLD-MCNC: 12.7 G/DL (ref 11.5–15.4)
IMM GRANULOCYTES # BLD AUTO: 0.05 THOUSAND/UL (ref 0–0.2)
IMM GRANULOCYTES NFR BLD AUTO: 1 % (ref 0–2)
LYMPHOCYTES # BLD AUTO: 2.96 THOUSANDS/ΜL (ref 0.6–4.47)
LYMPHOCYTES NFR BLD AUTO: 31 % (ref 14–44)
MCH RBC QN AUTO: 30.8 PG (ref 26.8–34.3)
MCHC RBC AUTO-ENTMCNC: 32.9 G/DL (ref 31.4–37.4)
MCV RBC AUTO: 94 FL (ref 82–98)
MONOCYTES # BLD AUTO: 0.61 THOUSAND/ΜL (ref 0.17–1.22)
MONOCYTES NFR BLD AUTO: 6 % (ref 4–12)
NEUTROPHILS # BLD AUTO: 5.79 THOUSANDS/ΜL (ref 1.85–7.62)
NEUTS SEG NFR BLD AUTO: 62 % (ref 43–75)
NRBC BLD AUTO-RTO: 0 /100 WBCS
PLATELET # BLD AUTO: 144 THOUSANDS/UL (ref 149–390)
POTASSIUM SERPL-SCNC: 3.9 MMOL/L (ref 3.5–5.3)
PROT SERPL-MCNC: 7.8 G/DL (ref 6.4–8.4)
PROT UR-MCNC: <6 MG/DL
RBC # BLD AUTO: 4.13 MILLION/UL (ref 3.81–5.12)
SODIUM SERPL-SCNC: 136 MMOL/L (ref 135–147)
WBC # BLD AUTO: 9.47 THOUSAND/UL (ref 4.31–10.16)

## 2022-08-23 PROCEDURE — 82570 ASSAY OF URINE CREATININE: CPT

## 2022-08-23 PROCEDURE — 84156 ASSAY OF PROTEIN URINE: CPT

## 2022-08-23 PROCEDURE — 80053 COMPREHEN METABOLIC PANEL: CPT

## 2022-08-23 PROCEDURE — 85025 COMPLETE CBC W/AUTO DIFF WBC: CPT

## 2022-08-23 PROCEDURE — 36415 COLL VENOUS BLD VENIPUNCTURE: CPT

## 2022-08-23 PROCEDURE — 86592 SYPHILIS TEST NON-TREP QUAL: CPT

## 2022-08-23 NOTE — TELEPHONE ENCOUNTER
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

## 2022-08-24 DIAGNOSIS — O99.113 BENIGN GESTATIONAL THROMBOCYTOPENIA IN THIRD TRIMESTER (HCC): Primary | ICD-10-CM

## 2022-08-24 DIAGNOSIS — D69.6 BENIGN GESTATIONAL THROMBOCYTOPENIA IN THIRD TRIMESTER (HCC): Primary | ICD-10-CM

## 2022-08-24 LAB — RPR SER QL: NORMAL

## 2022-08-25 ENCOUNTER — ROUTINE PRENATAL (OUTPATIENT)
Dept: OBGYN CLINIC | Facility: CLINIC | Age: 27
End: 2022-08-25

## 2022-08-25 VITALS — DIASTOLIC BLOOD PRESSURE: 74 MMHG | WEIGHT: 223 LBS | BODY MASS INDEX: 36.54 KG/M2 | SYSTOLIC BLOOD PRESSURE: 128 MMHG

## 2022-08-25 DIAGNOSIS — D69.6 BENIGN GESTATIONAL THROMBOCYTOPENIA IN THIRD TRIMESTER (HCC): Primary | ICD-10-CM

## 2022-08-25 DIAGNOSIS — Z3A.36 36 WEEKS GESTATION OF PREGNANCY: ICD-10-CM

## 2022-08-25 DIAGNOSIS — O99.113 BENIGN GESTATIONAL THROMBOCYTOPENIA IN THIRD TRIMESTER (HCC): Primary | ICD-10-CM

## 2022-08-25 PROCEDURE — PNV: Performed by: PHYSICIAN ASSISTANT

## 2022-08-25 NOTE — PROGRESS NOTES
Patient is a 31 YO  female presenting to the office at 36 3 weeks for routine OB care     BP: 128/74  TWG: 15lb  Fetal Movement: yes good movement  Feeling well today  Denies LOF, CTX, VB  GBS positive  Reviewed labor precautions  Declines cervical exam today  May be interested in IOL based on husbands schedule for work  Call for concerns  RTO 1 week

## 2022-09-01 ENCOUNTER — ROUTINE PRENATAL (OUTPATIENT)
Dept: OBGYN CLINIC | Facility: CLINIC | Age: 27
End: 2022-09-01

## 2022-09-01 VITALS — BODY MASS INDEX: 36.87 KG/M2 | DIASTOLIC BLOOD PRESSURE: 60 MMHG | WEIGHT: 225 LBS | SYSTOLIC BLOOD PRESSURE: 118 MMHG

## 2022-09-01 DIAGNOSIS — Z34.03 ENCOUNTER FOR SUPERVISION OF NORMAL FIRST PREGNANCY IN THIRD TRIMESTER: Primary | ICD-10-CM

## 2022-09-01 DIAGNOSIS — E66.9 OBESITY (BMI 30.0-34.9): ICD-10-CM

## 2022-09-01 PROCEDURE — PNV: Performed by: OBSTETRICS & GYNECOLOGY

## 2022-09-01 NOTE — PROGRESS NOTES
32 y o  Verna Palacio female at 37w3d (Estimated Date of Delivery: 22) for PNV      Pre-Chelsea Vitals    Flowsheet Row Most Recent Value   Prenatal Assessment    Movement Present   Prenatal Vitals    Blood Pressure 118/60   Weight - Scale 102 kg (225 lb)   Urine Albumin/Glucose    Dilation/Effacement/Station    Vaginal Drainage    Edema    LLE Edema Trace   RLE Edema Trace   Facial Edema None         kg (17 lb)  May desire IOL 40 weeks  Reviewed labor precautions  Traveling to [address] [place] [address] this weekend

## 2022-09-08 ENCOUNTER — APPOINTMENT (OUTPATIENT)
Dept: LAB | Facility: CLINIC | Age: 27
End: 2022-09-08
Payer: COMMERCIAL

## 2022-09-08 ENCOUNTER — ROUTINE PRENATAL (OUTPATIENT)
Dept: OBGYN CLINIC | Facility: CLINIC | Age: 27
End: 2022-09-08

## 2022-09-08 VITALS — SYSTOLIC BLOOD PRESSURE: 132 MMHG | WEIGHT: 227 LBS | DIASTOLIC BLOOD PRESSURE: 82 MMHG | BODY MASS INDEX: 37.2 KG/M2

## 2022-09-08 DIAGNOSIS — Z3A.38 38 WEEKS GESTATION OF PREGNANCY: ICD-10-CM

## 2022-09-08 DIAGNOSIS — D69.6 BENIGN GESTATIONAL THROMBOCYTOPENIA IN THIRD TRIMESTER (HCC): ICD-10-CM

## 2022-09-08 DIAGNOSIS — O99.113 BENIGN GESTATIONAL THROMBOCYTOPENIA IN THIRD TRIMESTER (HCC): ICD-10-CM

## 2022-09-08 DIAGNOSIS — Z34.03 ENCOUNTER FOR SUPERVISION OF NORMAL FIRST PREGNANCY IN THIRD TRIMESTER: Primary | ICD-10-CM

## 2022-09-08 LAB
ERYTHROCYTE [DISTWIDTH] IN BLOOD BY AUTOMATED COUNT: 13.2 % (ref 11.6–15.1)
HCT VFR BLD AUTO: 40.1 % (ref 34.8–46.1)
HGB BLD-MCNC: 13.2 G/DL (ref 11.5–15.4)
MCH RBC QN AUTO: 31.2 PG (ref 26.8–34.3)
MCHC RBC AUTO-ENTMCNC: 32.9 G/DL (ref 31.4–37.4)
MCV RBC AUTO: 95 FL (ref 82–98)
PLATELET # BLD AUTO: 125 THOUSANDS/UL (ref 149–390)
RBC # BLD AUTO: 4.23 MILLION/UL (ref 3.81–5.12)
WBC # BLD AUTO: 8.35 THOUSAND/UL (ref 4.31–10.16)

## 2022-09-08 PROCEDURE — PNV: Performed by: OBSTETRICS & GYNECOLOGY

## 2022-09-08 PROCEDURE — 36415 COLL VENOUS BLD VENIPUNCTURE: CPT

## 2022-09-08 PROCEDURE — 85027 COMPLETE CBC AUTOMATED: CPT

## 2022-09-08 NOTE — PROGRESS NOTES
Patient denies any contractions, pelvic pressure or fluid leaking  She is undressed for a cervical check

## 2022-09-09 NOTE — PROGRESS NOTES
32 y o    female at 41 4 wga EGA for PNV  BP : 132/82  TW  Feeling well   No complaints  Reviewed labor precautions and FKCs  Desires IOL after edc  F/u 1 week

## 2022-09-11 DIAGNOSIS — O99.113 BENIGN GESTATIONAL THROMBOCYTOPENIA IN THIRD TRIMESTER (HCC): Primary | ICD-10-CM

## 2022-09-11 DIAGNOSIS — D69.6 BENIGN GESTATIONAL THROMBOCYTOPENIA IN THIRD TRIMESTER (HCC): Primary | ICD-10-CM

## 2022-09-13 ENCOUNTER — ROUTINE PRENATAL (OUTPATIENT)
Dept: OBGYN CLINIC | Facility: CLINIC | Age: 27
End: 2022-09-13

## 2022-09-13 VITALS — WEIGHT: 226 LBS | DIASTOLIC BLOOD PRESSURE: 70 MMHG | BODY MASS INDEX: 37.04 KG/M2 | SYSTOLIC BLOOD PRESSURE: 118 MMHG

## 2022-09-13 DIAGNOSIS — E66.9 OBESITY (BMI 30.0-34.9): ICD-10-CM

## 2022-09-13 DIAGNOSIS — Z3A.39 39 WEEKS GESTATION OF PREGNANCY: ICD-10-CM

## 2022-09-13 DIAGNOSIS — D69.6 BENIGN GESTATIONAL THROMBOCYTOPENIA IN THIRD TRIMESTER (HCC): Primary | ICD-10-CM

## 2022-09-13 DIAGNOSIS — Z34.03 ENCOUNTER FOR SUPERVISION OF NORMAL FIRST PREGNANCY IN THIRD TRIMESTER: ICD-10-CM

## 2022-09-13 DIAGNOSIS — O99.113 BENIGN GESTATIONAL THROMBOCYTOPENIA IN THIRD TRIMESTER (HCC): Primary | ICD-10-CM

## 2022-09-13 PROCEDURE — PNV: Performed by: OBSTETRICS & GYNECOLOGY

## 2022-09-13 NOTE — PROGRESS NOTES
Wants to schedule an induction   Wants to review CBC from last week   Requests membrane sweep   Urine dip neg/neg

## 2022-09-13 NOTE — PROGRESS NOTES
This is a 32 y o   at 36w3d who presents for return OB visit  No complaints  Denies contractions, leakage, bleeding  Endorses fetal movement   BP: 118/70 TWlb    SVE: /-2  GBS positive  Discussed IOL - scheduled for  at 0700  Gestational thrombocytopenia: repeat CBC today or tomorrow then on admission     F/up postpartum

## 2022-09-14 ENCOUNTER — APPOINTMENT (OUTPATIENT)
Dept: LAB | Facility: CLINIC | Age: 27
End: 2022-09-14
Payer: COMMERCIAL

## 2022-09-14 DIAGNOSIS — D69.6 BENIGN GESTATIONAL THROMBOCYTOPENIA IN THIRD TRIMESTER (HCC): ICD-10-CM

## 2022-09-14 DIAGNOSIS — O99.113 BENIGN GESTATIONAL THROMBOCYTOPENIA IN THIRD TRIMESTER (HCC): ICD-10-CM

## 2022-09-14 LAB
ERYTHROCYTE [DISTWIDTH] IN BLOOD BY AUTOMATED COUNT: 13.4 % (ref 11.6–15.1)
HCT VFR BLD AUTO: 40 % (ref 34.8–46.1)
HGB BLD-MCNC: 13.1 G/DL (ref 11.5–15.4)
MCH RBC QN AUTO: 30.8 PG (ref 26.8–34.3)
MCHC RBC AUTO-ENTMCNC: 32.8 G/DL (ref 31.4–37.4)
MCV RBC AUTO: 94 FL (ref 82–98)
PLATELET # BLD AUTO: 136 THOUSANDS/UL (ref 149–390)
RBC # BLD AUTO: 4.25 MILLION/UL (ref 3.81–5.12)
WBC # BLD AUTO: 9.86 THOUSAND/UL (ref 4.31–10.16)

## 2022-09-14 PROCEDURE — 85027 COMPLETE CBC AUTOMATED: CPT

## 2022-09-14 PROCEDURE — 36415 COLL VENOUS BLD VENIPUNCTURE: CPT

## 2022-09-17 ENCOUNTER — ANESTHESIA (INPATIENT)
Dept: ANESTHESIOLOGY | Facility: HOSPITAL | Age: 27
End: 2022-09-17
Payer: COMMERCIAL

## 2022-09-17 ENCOUNTER — ANESTHESIA EVENT (INPATIENT)
Dept: ANESTHESIOLOGY | Facility: HOSPITAL | Age: 27
End: 2022-09-17
Payer: COMMERCIAL

## 2022-09-17 ENCOUNTER — NURSE TRIAGE (OUTPATIENT)
Dept: OTHER | Facility: OTHER | Age: 27
End: 2022-09-17

## 2022-09-17 ENCOUNTER — HOSPITAL ENCOUNTER (INPATIENT)
Facility: HOSPITAL | Age: 27
LOS: 2 days | Discharge: HOME | End: 2022-09-19
Attending: OBSTETRICS & GYNECOLOGY | Admitting: OBSTETRICS & GYNECOLOGY
Payer: COMMERCIAL

## 2022-09-17 DIAGNOSIS — Z3A.39 39 WEEKS GESTATION OF PREGNANCY: ICD-10-CM

## 2022-09-17 PROBLEM — O16.3 ELEVATED BLOOD PRESSURE AFFECTING PREGNANCY IN THIRD TRIMESTER, ANTEPARTUM: Status: ACTIVE | Noted: 2022-09-17

## 2022-09-17 PROBLEM — O13.9 GESTATIONAL HYPERTENSION: Status: ACTIVE | Noted: 2022-09-17

## 2022-09-17 LAB
ABO GROUP BLD: NORMAL
ALBUMIN SERPL BCP-MCNC: 3.7 G/DL (ref 3.5–5)
ALP SERPL-CCNC: 194 U/L (ref 34–104)
ALT SERPL W P-5'-P-CCNC: 11 U/L (ref 7–52)
ANION GAP SERPL CALCULATED.3IONS-SCNC: 12 MMOL/L (ref 4–13)
AST SERPL W P-5'-P-CCNC: 14 U/L (ref 13–39)
BASE EXCESS BLDCOA CALC-SCNC: -3.7 MMOL/L (ref 3–11)
BASE EXCESS BLDCOV CALC-SCNC: -4.5 MMOL/L (ref 1–9)
BILIRUB SERPL-MCNC: 0.34 MG/DL (ref 0.2–1)
BLD GP AB SCN SERPL QL: NEGATIVE
BUN SERPL-MCNC: 8 MG/DL (ref 5–25)
CALCIUM SERPL-MCNC: 9.7 MG/DL (ref 8.4–10.2)
CHLORIDE SERPL-SCNC: 103 MMOL/L (ref 96–108)
CO2 SERPL-SCNC: 20 MMOL/L (ref 21–32)
CREAT SERPL-MCNC: 0.61 MG/DL (ref 0.6–1.3)
CREAT UR-MCNC: 40.9 MG/DL
ERYTHROCYTE [DISTWIDTH] IN BLOOD BY AUTOMATED COUNT: 13.2 % (ref 11.6–15.1)
GFR SERPL CREATININE-BSD FRML MDRD: 122 ML/MIN/1.73SQ M
GLUCOSE SERPL-MCNC: 64 MG/DL (ref 65–140)
HCO3 BLDCOA-SCNC: 23.9 MMOL/L (ref 17.3–27.3)
HCO3 BLDCOV-SCNC: 19.8 MMOL/L (ref 12.2–28.6)
HCT VFR BLD AUTO: 38.3 % (ref 34.8–46.1)
HGB BLD-MCNC: 13.1 G/DL (ref 11.5–15.4)
MCH RBC QN AUTO: 30.8 PG (ref 26.8–34.3)
MCHC RBC AUTO-ENTMCNC: 34.2 G/DL (ref 31.4–37.4)
MCV RBC AUTO: 90 FL (ref 82–98)
O2 CT VFR BLDCOA CALC: 7.3 ML/DL
OXYHGB MFR BLDCOA: 31.8 %
OXYHGB MFR BLDCOV: 68.6 %
PCO2 BLDCOA: 53.1 MM[HG] (ref 30–60)
PCO2 BLDCOV: 34.6 MM HG (ref 27–43)
PH BLDCOA: 7.27 [PH] (ref 7.23–7.43)
PH BLDCOV: 7.38 [PH] (ref 7.19–7.49)
PLATELET # BLD AUTO: 126 THOUSANDS/UL (ref 149–390)
PMV BLD AUTO: 14.8 FL (ref 8.9–12.7)
PO2 BLDCOA: 16.6 MM HG (ref 5–25)
PO2 BLDCOV: 29.7 MM HG (ref 15–45)
POTASSIUM SERPL-SCNC: 3.8 MMOL/L (ref 3.5–5.3)
PROT SERPL-MCNC: 7.1 G/DL (ref 6.4–8.4)
PROT UR-MCNC: 4 MG/DL
PROT/CREAT UR: 0.1 MG/G{CREAT} (ref 0–0.1)
RBC # BLD AUTO: 4.25 MILLION/UL (ref 3.81–5.12)
RH BLD: POSITIVE
SAO2 % BLDCOV: 15.8 ML/DL
SODIUM SERPL-SCNC: 135 MMOL/L (ref 135–147)
SPECIMEN EXPIRATION DATE: NORMAL
WBC # BLD AUTO: 11.36 THOUSAND/UL (ref 4.31–10.16)

## 2022-09-17 PROCEDURE — 80053 COMPREHEN METABOLIC PANEL: CPT

## 2022-09-17 PROCEDURE — 86850 RBC ANTIBODY SCREEN: CPT

## 2022-09-17 PROCEDURE — 86900 BLOOD TYPING SEROLOGIC ABO: CPT

## 2022-09-17 PROCEDURE — 82570 ASSAY OF URINE CREATININE: CPT

## 2022-09-17 PROCEDURE — 99213 OFFICE O/P EST LOW 20 MIN: CPT

## 2022-09-17 PROCEDURE — 85027 COMPLETE CBC AUTOMATED: CPT

## 2022-09-17 PROCEDURE — 84156 ASSAY OF PROTEIN URINE: CPT

## 2022-09-17 PROCEDURE — 10907ZC DRAINAGE OF AMNIOTIC FLUID, THERAPEUTIC FROM PRODUCTS OF CONCEPTION, VIA NATURAL OR ARTIFICIAL OPENING: ICD-10-PCS | Performed by: OBSTETRICS & GYNECOLOGY

## 2022-09-17 PROCEDURE — 4A1HXCZ MONITORING OF PRODUCTS OF CONCEPTION, CARDIAC RATE, EXTERNAL APPROACH: ICD-10-PCS | Performed by: OBSTETRICS & GYNECOLOGY

## 2022-09-17 PROCEDURE — 82805 BLOOD GASES W/O2 SATURATION: CPT | Performed by: OBSTETRICS & GYNECOLOGY

## 2022-09-17 PROCEDURE — NC001 PR NO CHARGE: Performed by: OBSTETRICS & GYNECOLOGY

## 2022-09-17 PROCEDURE — 86592 SYPHILIS TEST NON-TREP QUAL: CPT

## 2022-09-17 PROCEDURE — 59400 OBSTETRICAL CARE: CPT | Performed by: OBSTETRICS & GYNECOLOGY

## 2022-09-17 PROCEDURE — 0KQM0ZZ REPAIR PERINEUM MUSCLE, OPEN APPROACH: ICD-10-PCS | Performed by: OBSTETRICS & GYNECOLOGY

## 2022-09-17 PROCEDURE — 86901 BLOOD TYPING SEROLOGIC RH(D): CPT

## 2022-09-17 RX ORDER — SIMETHICONE 80 MG
80 TABLET,CHEWABLE ORAL 4 TIMES DAILY PRN
Status: DISCONTINUED | OUTPATIENT
Start: 2022-09-17 | End: 2022-09-19 | Stop reason: HOSPADM

## 2022-09-17 RX ORDER — OXYTOCIN/RINGER'S LACTATE 30/500 ML
PLASTIC BAG, INJECTION (ML) INTRAVENOUS
Status: COMPLETED
Start: 2022-09-17 | End: 2022-09-17

## 2022-09-17 RX ORDER — DIAPER,BRIEF,INFANT-TODD,DISP
1 EACH MISCELLANEOUS DAILY PRN
Status: DISCONTINUED | OUTPATIENT
Start: 2022-09-17 | End: 2022-09-19 | Stop reason: HOSPADM

## 2022-09-17 RX ORDER — IBUPROFEN 600 MG/1
600 TABLET ORAL EVERY 6 HOURS PRN
Status: DISCONTINUED | OUTPATIENT
Start: 2022-09-17 | End: 2022-09-19 | Stop reason: HOSPADM

## 2022-09-17 RX ORDER — DOCUSATE SODIUM 100 MG/1
100 CAPSULE, LIQUID FILLED ORAL 2 TIMES DAILY
Status: DISCONTINUED | OUTPATIENT
Start: 2022-09-17 | End: 2022-09-19 | Stop reason: HOSPADM

## 2022-09-17 RX ORDER — ONDANSETRON 2 MG/ML
4 INJECTION INTRAMUSCULAR; INTRAVENOUS EVERY 6 HOURS PRN
Status: DISCONTINUED | OUTPATIENT
Start: 2022-09-17 | End: 2022-09-17

## 2022-09-17 RX ORDER — CALCIUM CARBONATE 200(500)MG
1000 TABLET,CHEWABLE ORAL DAILY PRN
Status: DISCONTINUED | OUTPATIENT
Start: 2022-09-17 | End: 2022-09-19 | Stop reason: HOSPADM

## 2022-09-17 RX ORDER — LIDOCAINE HYDROCHLORIDE AND EPINEPHRINE 15; 5 MG/ML; UG/ML
INJECTION, SOLUTION EPIDURAL AS NEEDED
Status: DISCONTINUED | OUTPATIENT
Start: 2022-09-17 | End: 2022-09-18 | Stop reason: HOSPADM

## 2022-09-17 RX ORDER — SODIUM CHLORIDE, SODIUM LACTATE, POTASSIUM CHLORIDE, CALCIUM CHLORIDE 600; 310; 30; 20 MG/100ML; MG/100ML; MG/100ML; MG/100ML
125 INJECTION, SOLUTION INTRAVENOUS CONTINUOUS
Status: DISCONTINUED | OUTPATIENT
Start: 2022-09-17 | End: 2022-09-17

## 2022-09-17 RX ORDER — FAMOTIDINE 20 MG/1
10 TABLET, FILM COATED ORAL 2 TIMES DAILY PRN
Status: DISCONTINUED | OUTPATIENT
Start: 2022-09-17 | End: 2022-09-19 | Stop reason: HOSPADM

## 2022-09-17 RX ORDER — ACETAMINOPHEN 325 MG/1
650 TABLET ORAL EVERY 4 HOURS PRN
Status: DISCONTINUED | OUTPATIENT
Start: 2022-09-17 | End: 2022-09-19 | Stop reason: HOSPADM

## 2022-09-17 RX ADMIN — SODIUM CHLORIDE, SODIUM LACTATE, POTASSIUM CHLORIDE, AND CALCIUM CHLORIDE 125 ML/HR: .6; .31; .03; .02 INJECTION, SOLUTION INTRAVENOUS at 12:26

## 2022-09-17 RX ADMIN — SODIUM CHLORIDE 2.5 MILLION UNITS: 9 INJECTION, SOLUTION INTRAVENOUS at 16:28

## 2022-09-17 RX ADMIN — Medication 1 APPLICATION: at 22:53

## 2022-09-17 RX ADMIN — WITCH HAZEL 1 PAD: 500 SOLUTION RECTAL; TOPICAL at 22:52

## 2022-09-17 RX ADMIN — Medication 30 UNITS: at 20:01

## 2022-09-17 RX ADMIN — SODIUM CHLORIDE 5 MILLION UNITS: 9 INJECTION, SOLUTION INTRAVENOUS at 12:27

## 2022-09-17 RX ADMIN — ONDANSETRON 4 MG: 2 INJECTION INTRAMUSCULAR; INTRAVENOUS at 20:52

## 2022-09-17 RX ADMIN — SODIUM CHLORIDE, SODIUM LACTATE, POTASSIUM CHLORIDE, AND CALCIUM CHLORIDE 125 ML/HR: .6; .31; .03; .02 INJECTION, SOLUTION INTRAVENOUS at 21:20

## 2022-09-17 RX ADMIN — SODIUM CHLORIDE, SODIUM LACTATE, POTASSIUM CHLORIDE, AND CALCIUM CHLORIDE 125 ML/HR: .6; .31; .03; .02 INJECTION, SOLUTION INTRAVENOUS at 15:13

## 2022-09-17 RX ADMIN — LIDOCAINE HYDROCHLORIDE AND EPINEPHRINE 5 ML: 15; 5 INJECTION, SOLUTION EPIDURAL at 12:58

## 2022-09-17 NOTE — OB LABOR/OXYTOCIN SAFETY PROGRESS
Labor Progress Note - Robert Porter 32 y o  female MRN: [id number]    Unit/Bed#: -01 Encounter: [id number]       Contraction Frequency (minutes): 1 5-3 5  Contraction Quality: Moderate  Tachysystole: No   Cervical Dilation: 7        Cervical Effacement: 90  Fetal Station: -1  Baseline Rate: 150 bpm  Fetal Heart Rate: 151 BPM  FHR Category: Category I             Vital Signs:   Vitals:    09/17/22 1356   BP: 124/76   Pulse: 70   Resp:    Temp:    SpO2:        Notes/comments:   SVE as above   More comfortable with epidural    FHT Cat 1 rate, 140 bpm, moderate variability, accelerations present, no decelerations  Plan to AROM at next check  D/w Dr Ruth Ann Adams,  9/17/2022 2:01 PM Patient

## 2022-09-17 NOTE — H&P
[address] [facility] 32 y o  female MRN: [id number]  Unit/Bed#: -01 Encounter: [id number]    Assessment: 32 y o  Nuris Piper at 39w5d admitted for labor   SVE: /-1, per Dr Tony Chen: Cat 1, rate 150 bpm, moderate variability, accelerations present, no decelerations  Clinical EFW: 80% ; Cephalic confirmed by TAUS  GBS status: Positive, PCN ordered   Postpartum contraception plan: not interested  Plan:   Elevated blood pressure affecting pregnancy in third trimester, antepartum  Assessment & Plan  Elevated BP on admission: 142/90  No other elevated BP documented in this pregnancy      Benign gestational thrombocytopenia in third trimester [facility])  Assessment & Plan  Most recent CBCs  : Plt 136,000  : Plt 125,000    * 39 weeks gestation of pregnancy  Assessment & Plan  Admitted for labor  Initial SVE /-1  O positive  GBS positive  PCN ordered  EFW 39%  Desires epidural for analgesia  Postpartum contraception:   Anticipate     Discussed case and plan w/ Dr Ariella Clement    Chief Complaint: contractions    HPI: Chip Sanchez is a 32 y o  Nuris Piper with an BINTA of 2022, by Last Menstrual Period at 39w5d who is being admitted for labor   She complains of uterine contractions, occurring every 3-5 minutes, has no LOF, and reports no VB  She states she has felt good FM  Patient Active Problem List   Diagnosis    Anxiety    Palpitations    Vitamin D deficiency    39 weeks gestation of pregnancy    Obesity (BMI 30 0-34  9)    Encounter for supervision of normal first pregnancy in third trimester    Benign gestational thrombocytopenia in third trimester ([facility] [address] )    Elevated blood pressure affecting pregnancy in third trimester, antepartum     Baby complications/comments: none    Review of Systems   Constitutional: Negative for chills and fever  Eyes: Negative for visual disturbance  Respiratory: Negative for cough and shortness of breath      Cardiovascular: Negative for chest pain  Gastrointestinal: Negative for abdominal pain, nausea and vomiting  Genitourinary: Negative for dysuria, vaginal bleeding and vaginal discharge  Neurological: Negative for dizziness and headaches  OB Hx:  OB History    Para Term  AB Living   1 0 0 0 0 0   SAB IAB Ectopic Multiple Live Births   0 0 0 0 0      # Outcome Date GA Lbr Refugio/2nd Weight Sex Delivery Anes PTL Lv   1 Current                Past Medical Hx:  Past Medical History:   Diagnosis Date    Anxiety     Urinary tract infection        Past Surgical hx:  Past Surgical History:   Procedure Laterality Date    WISDOM TOOTH EXTRACTION       Social Hx:  Alcohol use: denies  Tobacco use: denies    Allergies   Allergen Reactions    Bee Venom Facial Swelling       Medications Prior to Admission   Medication    famotidine (PEPCID) 10 mg tablet    Prenatal MV-Min-Fe Fum-FA-DHA (PRENATAL 1 PO)       Objective:  Temp:  [98 2 °F (36 8 °C)] 98 2 °F (36 8 °C)  HR:  [82] 82  Resp:  [20] 20  BP: (142)/(90) 142/90  Body mass index is 37 61 kg/m²  Physical Exam:  Physical Exam  Constitutional:       General: She is not in acute distress  Appearance: Normal appearance  HENT:      Head: Normocephalic and atraumatic  Cardiovascular:      Rate and Rhythm: Normal rate and regular rhythm  Pulses: Normal pulses  Heart sounds: Normal heart sounds  Pulmonary:      Effort: Pulmonary effort is normal  No respiratory distress  Breath sounds: Normal breath sounds  Abdominal:      Palpations: Abdomen is soft  Tenderness: There is no abdominal tenderness  Comments: gravid   Musculoskeletal:         General: No tenderness  Normal range of motion  Neurological:      General: No focal deficit present  Mental Status: She is alert  Mental status is at baseline  Motor: No weakness  Skin:     General: Skin is warm and dry     Psychiatric:         Mood and Affect: Mood normal          Judgment: Judgment normal         FHT:  Baseline Rate: 150 bpm  Variability: Moderate 6-25 bpm  Accelerations: 15 x 15 or greater, At variable times  Decelerations: None  FHR Category: Category I    TOCO:   Contraction Frequency (minutes): 1 5-3 5  Contraction Duration (seconds): 50-60  Contraction Quality: Moderate    Lab Results   Component Value Date    WBC 9 86 09/14/2022    HGB 13 1 09/14/2022    HCT 40 0 09/14/2022     (L) 09/14/2022     Lab Results   Component Value Date    K 3 9 08/23/2022     08/23/2022    CO2 24 08/23/2022    BUN 7 08/23/2022    CREATININE 0 66 08/23/2022    AST 18 08/23/2022    ALT 15 08/23/2022     Prenatal Labs: Reviewed      Blood type: O positive   Antibody: Negative  GBS: Positive as of 35w3d  HIV: nonreactive  Rubella: Immune  VDRL/RPR: Non reactive  HBsAg: Negative  Chlamydia: Negative  Gonorrhea: Negative  Diabetes 1 hour screen: Normal  Platelets: 822,302  Hgb: 13 1  >2 Midnights  INPATIENT     Signature/Title: Scooby Cruz DO  Date: 9/17/2022  Time: 11:51 AM

## 2022-09-17 NOTE — PLAN OF CARE
Problem: PAIN - ADULT  Goal: Verbalizes/displays adequate comfort level or baseline comfort level  Description: Interventions:  - Encourage patient to monitor pain and request assistance  - Assess pain using appropriate pain scale  - Administer analgesics based on type and severity of pain and evaluate response  - Implement non-pharmacological measures as appropriate and evaluate response  - Consider cultural and social influences on pain and pain management  - Notify physician/advanced practitioner if interventions unsuccessful or patient reports new pain  Outcome: Progressing     Problem: INFECTION - ADULT  Goal: Absence or prevention of progression during hospitalization  Description: INTERVENTIONS:  - Assess and monitor for signs and symptoms of infection  - Monitor lab/diagnostic results  - Monitor all insertion sites, i e  indwelling lines, tubes, and drains  - Monitor endotracheal if appropriate and nasal secretions for changes in amount and color  - [employer] appropriate cooling/warming therapies per order  - Administer medications as ordered  - Instruct and encourage patient and family to use good hand hygiene technique  - Identify and instruct in appropriate isolation precautions for identified infection/condition  Outcome: Progressing  Goal: Absence of fever/infection during neutropenic period  Description: INTERVENTIONS:  - Monitor WBC    Outcome: Progressing     Problem: SAFETY ADULT  Goal: Patient will remain free of falls  Description: INTERVENTIONS:  - Educate patient/family on patient safety including physical limitations  - Instruct patient to call for assistance with activity   - Consult OT/PT to assist with strengthening/mobility   - Keep Call bell within reach  - Keep bed low and locked with side rails adjusted as appropriate  - Keep care items and personal belongings within reach  - Initiate and maintain comfort rounds  - Make Fall Risk Sign visible to staff  - Apply yellow socks and bracelet for high fall risk patients  - Consider moving patient to room near nurses station  Outcome: Progressing  Goal: Maintain or return to baseline ADL function  Description: INTERVENTIONS:  -  Assess patient's ability to carry out ADLs; assess patient's baseline for ADL function and identify physical deficits which impact ability to perform ADLs (bathing, care of mouth/teeth, toileting, grooming, dressing, etc )  - Assess/evaluate cause of self-care deficits   - Assess range of motion  - Assess patient's mobility; develop plan if impaired  - Assess patient's need for assistive devices and provide as appropriate  - Encourage maximum independence but intervene and supervise when necessary  - Involve family in performance of ADLs  - Assess for home care needs following discharge   - Consider OT consult to assist with ADL evaluation and planning for discharge  - Provide patient education as appropriate  Outcome: Progressing  Goal: Maintains/Returns to pre admission functional level  Description: INTERVENTIONS:  - Perform BMAT or MOVE assessment daily    - Set and communicate daily mobility goal to care team and patient/family/caregiver     - Collaborate with rehabilitation services on mobility goals if consulted  Outcome: Progressing     Problem: DISCHARGE PLANNING  Goal: Discharge to home or other facility with appropriate resources  Description: INTERVENTIONS:  - Identify barriers to discharge w/patient and caregiver  - Arrange for needed discharge resources and transportation as appropriate  - Identify discharge learning needs (meds, wound care, etc )  - Arrange for interpretive services to assist at discharge as needed  - Refer to Case Management Department for coordinating discharge planning if the patient needs post-hospital services based on physician/advanced practitioner order or complex needs related to functional status, cognitive ability, or social support system  Outcome: Progressing     Problem: Knowledge Deficit  Goal: Patient/family/caregiver demonstrates understanding of disease process, treatment plan, medications, and discharge instructions  Description: Complete learning assessment and assess knowledge base    Interventions:  - Provide teaching at level of understanding  - Provide teaching via preferred learning methods  Outcome: Progressing     Problem: BIRTH - VAGINAL/ SECTION  Goal: Fetal and maternal status remain reassuring during the birth process  Description: INTERVENTIONS:  - Monitor vital signs  - Monitor fetal heart rate  - Monitor uterine activity  - Monitor labor progression (vaginal delivery)  - DVT prophylaxis  - Antibiotic prophylaxis  Outcome: Progressing  Goal: Emotionally satisfying birthing experience for mother/fetus  Description: Interventions:  - Assess, plan, implement and evaluate the nursing care given to the patient in labor  - Advocate the philosophy that each childbirth experience is a unique experience and support the family's chosen level of involvement and control during the labor process   - Actively participate in both the patient's and family's teaching of the birth process  - Consider cultural, Caodaism and age-specific factors and plan care for the patient in labor  Outcome: Progressing

## 2022-09-17 NOTE — TELEPHONE ENCOUNTER
Reason for Disposition   [1] First baby (primipara) AND [2] contractions < 6 minutes apart  AND [3] present 2 hours    Answer Assessment - Initial Assessment Questions  1  ONSET: "When did the symptoms begin?"     0300  2  CONTRACTIONS: "Describe the contractions that you are having " (e g , duration, frequency, regularity, severity)  Every 5 minutes   Increased intensity    3  BINTA: "What date are you expecting to deliver?"      9/19    4  PARITY: "Have you had a baby before?" If Yes, ask: "How long did the labor last?"     First baby    5  FETAL MOVEMENT: "Has the baby's movement decreased or changed significantly from normal?"  Good FM    6   OTHER SYMPTOMS: "Do you have any other symptoms?" (e g , leaking fluid from vagina, vaginal bleeding, fever, hand/facial swelling)  No    Protocols used: PREGNANCY - LABOR-ADULT-

## 2022-09-17 NOTE — TELEPHONE ENCOUNTER
Regardin Weeks Pregnant, Contractions 5 minutes apart  ----- Message from France Murphy sent at 2022  9:55 AM EDT -----  " My Wife is 44 Weeks Pregnant, she's having Contractions 5 minutes apart for the last half hour   She is Scheduled for an Induction tomorrow  "

## 2022-09-17 NOTE — ANESTHESIA PREPROCEDURE EVALUATION
Procedure:  LABOR ANALGESIA    Relevant Problems   ANESTHESIA (within normal limits)   (-) History of anesthesia complications      CARDIO   (-) Chest pain   (-) HTN (hypertension)      GYN   (+) 39 weeks gestation of pregnancy   (+) Encounter for supervision of normal first pregnancy in third trimester      HEMATOLOGY   (+) Benign gestational thrombocytopenia in third trimester ([facility] [address] )      NEURO/PSYCH   (+) Anxiety      PULMONARY   (-) Asthma   (-) URI (upper respiratory infection)      Lab Results   Component Value Date     (L) 09/17/2022         Physical Exam    Airway    Mallampati score: II  TM Distance: >3 FB  Neck ROM: full     Dental       Cardiovascular      Pulmonary      Other Findings        Anesthesia Plan  ASA Score- 2     Anesthesia Type- epidural with ASA Monitors  Additional Monitors:   Airway Plan:           Plan Factors-Exercise tolerance (METS): >4 METS  Chart reviewed  Existing labs reviewed  Patient summary reviewed  Induction-     Postoperative Plan-     Informed Consent- Anesthetic plan and risks discussed with patient  I personally reviewed this patient with the CRNA  Discussed and agreed on the Anesthesia Plan with the CRNA  Walt Hui

## 2022-09-17 NOTE — ANESTHESIA PROCEDURE NOTES
Epidural Block    Patient location during procedure: OB  Start time: 9/17/2022 12:56 PM  Reason for block: procedure for pain and at surgeon's request  Staffing  Performed: CRNA   Anesthesiologist: Moon Ramos MD  Resident/CRNA: Mikey Leach CRNA  Preanesthetic Checklist  Completed: patient identified, IV checked, site marked, risks and benefits discussed, surgical consent, monitors and equipment checked, pre-op evaluation and timeout performed  Epidural  Patient position: sitting  Prep: ChloraPrep  Patient monitoring: cardiac monitor and frequent blood pressure checks  Approach: midline  Location: lumbar  Injection technique: YUAN saline  Needle  Needle type: Tuohy   Needle gauge: 18 G  Catheter type: side hole  Catheter size: 20 G  Catheter at skin depth: 11 cm  Block catheter secured with: dressing, tape, device    Test dose: negative  Assessment  Number of attempts: 1negative aspiration for CSF, negative aspiration for heme and no paresthesia on injection  patient tolerated the procedure well with no immediate complications

## 2022-09-17 NOTE — ASSESSMENT & PLAN NOTE
Admitted for labor  Initial SVE /-1  O positive  GBS positive   PCN ordered  EFW 39%  Desires epidural for analgesia  Postpartum contraception:   Anticipate

## 2022-09-17 NOTE — OB LABOR/OXYTOCIN SAFETY PROGRESS
Labor Progress Note - Greg Lloyd 32 y o  female MRN: [id number]    Unit/Bed#: -01 Encounter: [id number]       Contraction Frequency (minutes): 1 5-3 5  Contraction Quality: Moderate  Tachysystole: No   Cervical Dilation: 10  Dilation Complete Date: 09/17/22  Dilation Complete Time: 1825  Cervical Effacement: 100  Fetal Station: 0  Baseline Rate: 150 bpm  Fetal Heart Rate: 151 BPM  FHR Category: Category I               Vital Signs:   Vitals:    09/17/22 1726   BP: 128/81   Pulse: 74   Resp:    Temp:    SpO2:        Notes/comments:   Pt had two minute deceleration that resolved with repositioned  She is completely dilated and effaced  Baby is at 0 station  FHT Cat 2, rate 150 bpm, moderate variability, accelerations present, one two minute prolonged deceleration, resolved, no subsequent decelerations   Plan to start pushing      D/w Dr Kasey Garcia,  9/17/2022 6:33 PM

## 2022-09-17 NOTE — OB LABOR/OXYTOCIN SAFETY PROGRESS
Labor Progress Note - John De Leon 32 y o  female MRN: [id number]    Unit/Bed#: -01 Encounter: [id number]        Cervical Dilation: 7  Effacement: 80  Station: -1          Vital Signs:   Vitals:    09/17/22 1726   BP: 128/81   Pulse: 74   Resp:    Temp:    SpO2:        Notes/comments:   SVE unchanged  AROMcl @ 4111  Pt comfortable with epidural  FHT: Cat 1, rate 150 bpm, moderate variability, accelerations present, no decelerations  Plan to start Pitocin if unchanged at next SVE     D/w Dr Epifanio Kate, DO 9/17/2022 1540 hrs

## 2022-09-17 NOTE — ASSESSMENT & PLAN NOTE
Elevated BP on admission: 142/90  No other elevated BP documented in this pregnancy  Multiple elevated BP intrapartum  One severe range pressure in 180s  BP cuff adjusted   No other severe range Bps  CMP wnl  Urine PC ratio: 0 1    Systolic (08YTB), KFM:812 , Min:98 , XKZ:746   Diastolic (46NOA), BPC:71, Min:60, Max:66

## 2022-09-18 LAB
ERYTHROCYTE [DISTWIDTH] IN BLOOD BY AUTOMATED COUNT: 13.3 % (ref 11.6–15.1)
HCT VFR BLD AUTO: 30.9 % (ref 34.8–46.1)
HGB BLD-MCNC: 10.7 G/DL (ref 11.5–15.4)
MCH RBC QN AUTO: 31.4 PG (ref 26.8–34.3)
MCHC RBC AUTO-ENTMCNC: 34.6 G/DL (ref 31.4–37.4)
MCV RBC AUTO: 91 FL (ref 82–98)
PLATELET # BLD AUTO: 143 THOUSANDS/UL (ref 149–390)
PMV BLD AUTO: 14.6 FL (ref 8.9–12.7)
RBC # BLD AUTO: 3.41 MILLION/UL (ref 3.81–5.12)
RPR SER QL: NORMAL
WBC # BLD AUTO: 18.17 THOUSAND/UL (ref 4.31–10.16)

## 2022-09-18 PROCEDURE — 85027 COMPLETE CBC AUTOMATED: CPT

## 2022-09-18 PROCEDURE — 99024 POSTOP FOLLOW-UP VISIT: CPT | Performed by: OBSTETRICS & GYNECOLOGY

## 2022-09-18 RX ORDER — ACETAMINOPHEN 325 MG/1
650 TABLET ORAL EVERY 4 HOURS PRN
Refills: 0
Start: 2022-09-18 | End: 2022-09-27 | Stop reason: ALTCHOICE

## 2022-09-18 RX ORDER — DOCUSATE SODIUM 100 MG/1
100 CAPSULE, LIQUID FILLED ORAL 2 TIMES DAILY
Refills: 0
Start: 2022-09-18 | End: 2022-09-27 | Stop reason: ALTCHOICE

## 2022-09-18 RX ORDER — IBUPROFEN 600 MG/1
600 TABLET ORAL EVERY 6 HOURS PRN
Qty: 30 TABLET | Refills: 0
Start: 2022-09-18 | End: 2022-09-27 | Stop reason: ALTCHOICE

## 2022-09-18 RX ADMIN — IBUPROFEN 600 MG: 600 TABLET ORAL at 13:27

## 2022-09-18 RX ADMIN — DOCUSATE SODIUM 100 MG: 100 CAPSULE, LIQUID FILLED ORAL at 08:56

## 2022-09-18 RX ADMIN — IBUPROFEN 600 MG: 600 TABLET ORAL at 22:33

## 2022-09-18 RX ADMIN — ACETAMINOPHEN 650 MG: 325 TABLET, FILM COATED ORAL at 08:59

## 2022-09-18 RX ADMIN — DOCUSATE SODIUM 100 MG: 100 CAPSULE, LIQUID FILLED ORAL at 17:07

## 2022-09-18 RX ADMIN — ACETAMINOPHEN 650 MG: 325 TABLET, FILM COATED ORAL at 17:07

## 2022-09-18 RX ADMIN — Medication 1 TABLET: at 08:56

## 2022-09-18 NOTE — PROGRESS NOTES
Progress Note - OB/GYN  Rupert Miranda 32 y o  female MRN: [id number]  Unit/Bed#: -01 Encounter: [id number]    Assessment and Plan     Michael Mullen is a patient of: [facility]  She is PPD# 1 s/p  spontaneous vaginal delivery  Recovering well and is stable       Gestational hypertension  Assessment & Plan  Elevated BP on admission: 142/90  No other elevated BP documented in this pregnancy  Multiple elevated BP intrapartum  One severe range pressure in 180s  BP cuff adjusted  No other severe range Bps  CMP wnl  Urine PC ratio: 0 1    Systolic (14XLV), QK , Min:86 , IEM:396   Diastolic (02WRM), UCU:76, Min:44, Max:90          Benign gestational thrombocytopenia in third trimester [facility])  Assessment & Plan  Most recent CBCs  : Plt 136,000  : Plt 125,000  PPD#1 AM CBC pending    *  (spontaneous vaginal delivery)  Assessment & Plan  PPH secondary due to sulcal laceration bleeding  QBL 1223  Plan for AM CBC  Pt experience vagal episode s/p delvery  Vitals stable   Lochia WNL   Recovering well   Appropriate bowel and bladder function   Pain well controlled   Tolerating diet   Breastfeeding   Ambulating without issues   No lower extremity tenderness  GBS positive, adequately treated   Rh positive  Not interested in contraception at this time  Anticipate D/C on PPD#2       Disposition    - Anticipate discharge home on PPD# 2    Subjective/Objective     Chief Complaint: Postpartum State     Subjective: Rupert Miranda is PPD/POD#1 s/p  spontaneous vaginal delivery  She has no current complaints  Pain is well controlled  Patient is currently voiding  She is ambulating  Patient is currently passing flatus and has had no bowel movement  She is tolerating PO, and denies nausea or vomitting  Patient denies fever, chills, chest pain, shortness of breath, or calf tenderness  Lochia is normal  She is  Breastfeeding  She is recovering well and is stable         Vitals:   /66 (BP Location: Left arm)   Pulse 101   Temp 98 °F (36 7 °C) (Oral)   Resp 18   Ht 5' 5" (1 651 m)   Wt 103 kg (226 lb)   LMP 12/13/2021 (Exact Date)   SpO2 98%   Breastfeeding Yes   BMI 37 61 kg/m²       Intake/Output Summary (Last 24 hours) at 9/18/2022 0516  Last data filed at 9/18/2022 0445  Gross per 24 hour   Intake 2233 82 ml   Output 2648 ml   Net -414 18 ml       Invasive Devices  Timeline    Peripheral Intravenous Line  Duration           Peripheral IV 09/17/22 Left;Ventral (anterior) Wrist <1 day          Drain  Duration           Urethral Catheter Double-lumen;Non-latex 16 Fr  <1 day                Physical Exam:   GEN: Clay Cousin appears well, alert and oriented x 3, pleasant and cooperative   CARDIO: RRR, no murmurs or rubs  RESP:  CTAB, no wheezes or rales  ABDOMEN: soft, no tenderness, no distention, fundus @ u-1  EXTREMITIES: non tender, no erythema    Labs:     Hemoglobin   Date Value Ref Range Status   09/18/2022 10 7 (L) 11 5 - 15 4 g/dL Final   09/17/2022 13 1 11 5 - 15 4 g/dL Final     Comment: This is an appended report  These results have been appended to a previously preliminary verified report  WBC   Date Value Ref Range Status   09/18/2022 18 17 (H) 4 31 - 10 16 Thousand/uL Final   09/17/2022 11 36 (H) 4 31 - 10 16 Thousand/uL Final     Comment: This is an appended report  These results have been appended to a previously preliminary verified report       Platelets   Date Value Ref Range Status   09/18/2022 143 (L) 149 - 390 Thousands/uL Final   09/17/2022 126 (L) 149 - 390 Thousands/uL Final     Creatinine   Date Value Ref Range Status   09/17/2022 0 61 0 60 - 1 30 mg/dL Final     Comment:     Standardized to IDMS reference method   08/23/2022 0 66 0 60 - 1 30 mg/dL Final     Comment:     Standardized to IDMS reference method     AST   Date Value Ref Range Status   09/17/2022 14 13 - 39 U/L Final     Comment:     Specimen collection should occur prior to Sulfasalazine administration due to the potential for falsely depressed results  08/23/2022 18 5 - 45 U/L Final     Comment:     Slightly Hemolyzed; Results May be Affected  Specimen collection should occur prior to Sulfasalazine administration due to the potential for falsely depressed results  ALT   Date Value Ref Range Status   09/17/2022 11 7 - 52 U/L Final     Comment:     Specimen collection should occur prior to Sulfasalazine administration due to the potential for falsely depressed results  08/23/2022 15 12 - 78 U/L Final     Comment:     Specimen collection should occur prior to Sulfasalazine and/or Sulfapyridine administration due to the potential for falsely depressed results             Francesco Mcleod DO  9/18/2022  5:16 AM

## 2022-09-18 NOTE — L&D DELIVERY NOTE
[facility] DELIVERY NOTE  Jayden Joe 32 y o  female MRN: [id number]  Unit/Bed#: -01 Encounter: [id number]    Obstetrician:    Dr Tito Parks MD    Assistant:   Dr Amos Schmitz    Pre-Delivery Diagnosis:   IUP @ 39w5d  Gestational thrombocytopenia  Gestational HTN    Post-Delivery Diagnosis:   Same as above - Delivered  2nd degree laceration  Sulcus tear  Postpartum hemorrhage    Procedure:  Spontaneous vaginal delivery  Repair of 2nd degree and sulcal spontaneous laceration      Anesthesia:  epidural    Specimens:   Cord blood obtained   Placenta; normal appearing, central insertion, intact   Arterial and venous blood gases (below)     Gases:  Umbilical Cord Venous Blood Gas:  Results from last 7 days   Lab Units 22   PH COV  7 375   PCO2 COV mm HG 34 6   HCO3 COV mmol/L 19 8   BASE EXC COV mmol/L -4 5*   O2 CT CD VB mL/dL 15 8   O2 HGB, VENOUS CORD % 30 6     Umbilical Cord Arterial Blood Gas:  Results from last 7 days   Lab Units 22   PH COA  7 272   PCO2 COA  53 1   PO2 COA mm HG 16 6   HCO3 COA mmol/L 23 9   BASE EXC COA mmol/L -3 7*   O2 CONTENT CORD ART ml/dl 7 3   O2 HGB, ARTERIAL CORD % 31 8       Quantitative Blood Loss:   1223 mL           Complications:    Postpartum hemorrhage apparent    Brief Description of Labor Course: Jayden Joe is a 32 y o   female at 39w5d who was admitted to L&D for labor  Her initial SVE was 5/80/-1  Her membranes were artificially ruptured at 1537 for clear fluids  She received an epidural for analgesia  She progressed to complete at 1827, pushed for 61 min, and delivered a healthy  at 5  Description of Delivery:   With  the assistance of maternal expulsive forces, the fetal vertex delivered spontaneously  A the fetus presented left occiput anterior with compound presentation with left hand presenting  The anterior right shoulder was delivered atraumatically with gentle downward traction   The contralateral arm was delivered with gentle upward traction  The remainder of the fetus delivered spontaneously at , resulting in a viable female   Upon delivery, the infant was placed on the mothers abdomen and the cord was doubly clamped and cut after 30 seconds  The  was noted to have good tone and cry spontaneously  There was no evidence of injury  The  was examined by nursing at bedside  Umbilical cord blood and umbilical artery and venous gases were collected and sent to the lab  An intact placenta was delivered spontaneously at  using fundal massage and gentle cord traction and was noted to have a centrally-inserted 3-vessel cord  Active management of the third stage of labor was undertaken with IV pitocin at 250milliunits/min   Outcome:  Living  with APGARS 8 (1 min) and 9 (5 min)   weight: pending    Inspection of the perineum, vagina, labia, cervix, and urethra revealed a 2nd degree and left sulcal laceration  Bleeding was noted to be under control after repair  A bimanual exam was performed  Perineal Inspection/Laceration Repair  Inspection of the perineum, vagina, labia, cervix, and urethra revealed a 2nd degree and left sulcal laceration, which was repaired in standard fashion with 3-0 Vicryl rapid  Patient was comfortable with epidural at that time  The laceration showed good tissue reapproximation and hemostasis  At the conclusion of the delivery, all needle, sponge, and instrument counts were noted to be correct  Patient tolerated the procedure well and was allowed to recover in labor and delivery room with family and  before being transferred to the post-partum floor  Conclusion:  Mother and baby are currently recovering nicely in stable condition  Attending Supervision:   Dr Tito Parks MD was present for the entire procedure      Amos Schmitz DO  OB/GYN PGY-1   2022 9:00 PM

## 2022-09-18 NOTE — DISCHARGE SUMMARY
Discharge Summary - OB/GYN   Kali Oquendo 32 y o  female MRN: [id number]  Unit/Bed#: -01 Encounter: [id number]      Admission Date: 2022     Discharge Date: 22    Admitting Diagnosis:   Pregnancy at 39w5d  Spontaneous labor   Gestational thrombocytopenia  Elevated BP without diagnosis of HTN  Obesity in pregnancy     Discharge Diagnosis:    at 39w5d   Spontaneous labor  Gestational thrombocytopenia  Gestational hypertension   Obesity in pregnancy     Procedures: spontaneous vaginal delivery    Attending: Mitul Asif MD    Hospital Course: Kali Oquendo is a 32 y o  [place] female who was initially admitted to L&D on admitted to hospital at 39w5d for spontaneous labor at 5 cm dilation  Patient was admitted and initially expectantly managed  She received an epidural for pain management  She transitioned into the active stage of labor  AROM was performed for labor augmentation  She progressed to complete at 1825, and started pushing at [address]  She delivered a viable female  on 22 at 2000 via spontaneous vaginal delivery  Patient tolerated the procedure well  Apgars were 8 (1 min) and 9 (5 min)  Weight was 6lbs 7 4oz   was transferred to  nursery  Her post-partum course was uncomplicated  She was spontaneously voiding after delivery  She was breastfeeding  Her contraception plan is none  She was discharged on PPD#2, by which point her pain was controlled and she was tolerating PO  She had appropriate bowel function  She was discharged with standard post partum instructions to follow up with her physician in 3 weeks  Complications: none apparent    Condition at discharge: good     Discharge instructions/Information to patient and family:   See after visit summary for information provided to patient and family        Provisions for Follow-Up Care:  See after visit summary for information related to follow-up care and any pertinent home health orders  Disposition: Home    Planned Readmission: No    Discharge instructions/Information to patient and family:   - Do not place anything (no partner, tampons or douche) in your vagina for 6 weeks  -You may gradually return to your usual activities as tolerated  Avoid heavy lifting for the first few weeks    -Please do not drive for 1 week if you have no stitches and for 2 weeks if you have stitches or underwent a  delivery     -You may take baths or shower per your preference  -Examine your breasts in the mirror daily and call for redness or tenderness or increased warmth    -Please call us for temperature > 100 4*F or 38* C, worsening pain or a foul discharge  Discharge Medications:   Prenatal vitamin daily for 6 months or the duration of nursing whichever is longer  Motrin 600 mg orally every 6 hours as needed for pain  Tylenol (over the counter) per bottle directions as needed for pain: do NOT use with percocet  Hydrocortisone cream 1% (over the counter) applied 1-2x daily to hemorrhoids as needed    Provisions for Follow-Up Care: Follow up with your doctor in 6 weeks for postpartum care       Leora Fontaine MD  PGY-1

## 2022-09-18 NOTE — ASSESSMENT & PLAN NOTE
PPH secondary due to sulcal laceration bleeding  QBL 1223  Plan for AM CBC  Pt experience vagal episode s/p delvery     Vitals stable   Lochia WNL   Recovering well   Appropriate bowel and bladder function   Pain well controlled   Tolerating diet   Breastfeeding   Ambulating without issues   No lower extremity tenderness  GBS positive, adequately treated   Rh positive  Not interested in contraception at this time  Anticipate D/C on PPD#2

## 2022-09-18 NOTE — ANESTHESIA POSTPROCEDURE EVALUATION
Post-Op Assessment Note    CV Status:  Stable  Pain Score: 0    Pain management: adequate     Mental Status:  Alert and awake   Hydration Status:  Euvolemic   PONV Controlled:  Controlled   Airway Patency:  Patent      Post Op Vitals Reviewed: Yes      Staff: CRNA     Post-op block assessment: catheter intact and no complications      No complications documented      BP      Temp      Pulse    Resp      SpO2

## 2022-09-18 NOTE — PLAN OF CARE
Problem: PAIN - ADULT  Goal: Verbalizes/displays adequate comfort level or baseline comfort level  Description: Interventions:  - Encourage patient to monitor pain and request assistance  - Assess pain using appropriate pain scale  - Administer analgesics based on type and severity of pain and evaluate response  - Implement non-pharmacological measures as appropriate and evaluate response  - Consider cultural and social influences on pain and pain management  - Notify physician/advanced practitioner if interventions unsuccessful or patient reports new pain  Outcome: Progressing     Problem: INFECTION - ADULT  Goal: Absence or prevention of progression during hospitalization  Description: INTERVENTIONS:  - Assess and monitor for signs and symptoms of infection  - Monitor lab/diagnostic results  - Monitor all insertion sites, i e  indwelling lines, tubes, and drains    - [place] appropriate cooling/warming therapies per order  - Administer medications as ordered  - Instruct and encourage patient and family to use good hand hygiene technique  - Identify and instruct in appropriate isolation precautions for identified infection/condition  Outcome: Progressing  Goal: Absence of fever/infection during neutropenic period  Description: INTERVENTIONS:  - Monitor WBC as ordered     Outcome: Progressing     Problem: SAFETY ADULT  Goal: Patient will remain free of falls  Description: INTERVENTIONS:  - Educate patient/family on patient safety including physical limitations  - Instruct patient to call for assistance with activity   - Consult OT/PT to assist with strengthening/mobility   - Keep Call bell within reach  - Keep bed low and locked with side rails adjusted as appropriate  - Keep care items and personal belongings within reach  - Initiate and maintain comfort rounds  - Make Fall Risk Sign visible to staff  - Apply yellow socks and bracelet for high fall risk patients  - Consider moving patient to room near nurses station  Outcome: Progressing  Goal: Maintain or return to baseline ADL function  Description: INTERVENTIONS:  -  Assess patient's ability to carry out ADLs; assess patient's baseline for ADL function and identify physical deficits which impact ability to perform ADLs (bathing, care of mouth/teeth, toileting, grooming, dressing, etc )  - Assess/evaluate cause of self-care deficits   - Assess range of motion  - Assess patient's mobility; develop plan if impaired  - Assess patient's need for assistive devices and provide as appropriate  - Encourage maximum independence but intervene and supervise when necessary  - Involve family in performance of ADLs  - Assess for home care needs following discharge   - Consider OT consult to assist with ADL evaluation and planning for discharge  - Provide patient education as appropriate  Outcome: Progressing  Goal: Maintains/Returns to pre admission functional level  Description: INTERVENTIONS:  - Perform BMAT or MOVE assessment daily    - Set and communicate daily mobility goal to care team and patient/family/caregiver     - Collaborate with rehabilitation services on mobility goals if consulted  Outcome: Progressing     Problem: DISCHARGE PLANNING  Goal: Discharge to home or other facility with appropriate resources  Description: INTERVENTIONS:  - Identify barriers to discharge w/patient and caregiver  - Arrange for needed discharge resources and transportation as appropriate  - Identify discharge learning needs    - Refer to Case Management Department for coordinating discharge planning if the patient needs post-hospital services based on physician/advanced practitioner order or complex needs related to functional status, cognitive ability, or social support system  Outcome: Progressing     Problem: Knowledge Deficit  Goal: Patient/family/caregiver demonstrates understanding of disease process, treatment plan, medications, and discharge instructions  Description: Complete learning assessment and assess knowledge base    Interventions:  - Provide teaching at level of understanding  - Provide teaching via preferred learning methods  Outcome: Progressing     Problem: BIRTH - VAGINAL/ SECTION  Goal: Fetal and maternal status remain reassuring during the birth process  Description: INTERVENTIONS:  - Monitor vital signs  - Monitor fetal heart rate  - Monitor uterine activity  - Monitor labor progression (vaginal delivery)  - DVT prophylaxis  - Antibiotic prophylaxis  Outcome: Completed  Goal: Emotionally satisfying birthing experience for mother/fetus  Description: Interventions:  - Assess, plan, implement and evaluate the nursing care given to the patient in labor  - Advocate the philosophy that each childbirth experience is a unique experience and support the family's chosen level of involvement and control during the labor process   - Actively participate in both the patient's and family's teaching of the birth process  - Consider cultural, Taoist and age-specific factors and plan care for the patient in labor  Outcome: Completed     Problem: POSTPARTUM  Goal: Experiences normal postpartum course  Description: INTERVENTIONS:  - Monitor maternal vital signs  - Assess uterine involution and lochia  Outcome: Progressing  Goal: Appropriate maternal -  bonding  Description: INTERVENTIONS:  - Identify family support  - Assess for appropriate maternal/infant bonding   -Encourage maternal/infant bonding opportunities  - Referral to  or  as needed  Outcome: Progressing  Goal: Establishment of infant feeding pattern  Description: INTERVENTIONS:  - Assess breast/bottle feeding  - Refer to lactation as needed  Outcome: Progressing  Goal: Incision(s), wounds(s) or drain site(s) healing without S/S of infection  Description: INTERVENTIONS  - Assess and document dressing, incision, wound bed, drain sites and surrounding tissue  - Provide patient and family education    Outcome: Progressing

## 2022-09-19 VITALS
BODY MASS INDEX: 37.65 KG/M2 | HEART RATE: 94 BPM | SYSTOLIC BLOOD PRESSURE: 116 MMHG | RESPIRATION RATE: 18 BRPM | HEIGHT: 65 IN | DIASTOLIC BLOOD PRESSURE: 71 MMHG | WEIGHT: 226 LBS | TEMPERATURE: 98.2 F | OXYGEN SATURATION: 99 %

## 2022-09-19 PROBLEM — O13.3 GESTATIONAL HYPERTENSION, THIRD TRIMESTER: Status: ACTIVE | Noted: 2022-09-19

## 2022-09-19 PROBLEM — O13.3 GESTATIONAL HYPERTENSION, THIRD TRIMESTER: Status: RESOLVED | Noted: 2022-09-19 | Resolved: 2022-09-19

## 2022-09-19 PROCEDURE — 99024 POSTOP FOLLOW-UP VISIT: CPT | Performed by: OBSTETRICS & GYNECOLOGY

## 2022-09-19 RX ADMIN — DOCUSATE SODIUM 100 MG: 100 CAPSULE, LIQUID FILLED ORAL at 10:48

## 2022-09-19 RX ADMIN — IBUPROFEN 600 MG: 600 TABLET ORAL at 10:48

## 2022-09-19 RX ADMIN — IBUPROFEN 600 MG: 600 TABLET ORAL at 19:07

## 2022-09-19 RX ADMIN — DOCUSATE SODIUM 100 MG: 100 CAPSULE, LIQUID FILLED ORAL at 19:07

## 2022-09-19 NOTE — PLAN OF CARE
Problem: PAIN - ADULT  Goal: Verbalizes/displays adequate comfort level or baseline comfort level  Description: Interventions:  - Encourage patient to monitor pain and request assistance  - Assess pain using appropriate pain scale  - Administer analgesics based on type and severity of pain and evaluate response  - Implement non-pharmacological measures as appropriate and evaluate response  - Consider cultural and social influences on pain and pain management  - Notify physician/advanced practitioner if interventions unsuccessful or patient reports new pain  Outcome: Adequate for Discharge     Problem: INFECTION - ADULT  Goal: Absence or prevention of progression during hospitalization  Description: INTERVENTIONS:  - Assess and monitor for signs and symptoms of infection  - Monitor lab/diagnostic results  - Monitor all insertion sites, i e  indwelling lines, tubes, and drains  - Monitor endotracheal if appropriate and nasal secretions for changes in amount and color  - [place] appropriate cooling/warming therapies per order  - Administer medications as ordered  - Instruct and encourage patient and family to use good hand hygiene technique  - Identify and instruct in appropriate isolation precautions for identified infection/condition  Outcome: Adequate for Discharge  Goal: Absence of fever/infection during neutropenic period  Description: INTERVENTIONS:  - Monitor WBC    Outcome: Adequate for Discharge     Problem: SAFETY ADULT  Goal: Patient will remain free of falls  Description: INTERVENTIONS:  - Educate patient/family on patient safety including physical limitations  - Instruct patient to call for assistance with activity   - Consult OT/PT to assist with strengthening/mobility   - Keep Call bell within reach  - Keep bed low and locked with side rails adjusted as appropriate  - Keep care items and personal belongings within reach  - Initiate and maintain comfort rounds  - Make Fall Risk Sign visible to staff  - Apply yellow socks and bracelet for high fall risk patients  - Consider moving patient to room near nurses station  Outcome: Adequate for Discharge  Goal: Maintain or return to baseline ADL function  Description: INTERVENTIONS:  -  Assess patient's ability to carry out ADLs; assess patient's baseline for ADL function and identify physical deficits which impact ability to perform ADLs (bathing, care of mouth/teeth, toileting, grooming, dressing, etc )  - Assess/evaluate cause of self-care deficits   - Assess range of motion  - Assess patient's mobility; develop plan if impaired  - Assess patient's need for assistive devices and provide as appropriate  - Encourage maximum independence but intervene and supervise when necessary  - Involve family in performance of ADLs  - Assess for home care needs following discharge   - Consider OT consult to assist with ADL evaluation and planning for discharge  - Provide patient education as appropriate  Outcome: Adequate for Discharge  Goal: Maintains/Returns to pre admission functional level  Description: INTERVENTIONS:  - Perform BMAT or MOVE assessment daily    - Set and communicate daily mobility goal to care team and patient/family/caregiver     - Collaborate with rehabilitation services on mobility goals if consulted  Outcome: Adequate for Discharge     Problem: DISCHARGE PLANNING  Goal: Discharge to home or other facility with appropriate resources  Description: INTERVENTIONS:  - Identify barriers to discharge w/patient and caregiver  - Arrange for needed discharge resources and transportation as appropriate  - Identify discharge learning needs (meds, wound care, etc )  - Arrange for interpretive services to assist at discharge as needed  - Refer to Case Management Department for coordinating discharge planning if the patient needs post-hospital services based on physician/advanced practitioner order or complex needs related to functional status, cognitive ability, or social support system  Outcome: Adequate for Discharge     Problem: Knowledge Deficit  Goal: Patient/family/caregiver demonstrates understanding of disease process, treatment plan, medications, and discharge instructions  Description: Complete learning assessment and assess knowledge base    Interventions:  - Provide teaching at level of understanding  - Provide teaching via preferred learning methods  Outcome: Adequate for Discharge     Problem: POSTPARTUM  Goal: Experiences normal postpartum course  Description: INTERVENTIONS:  - Monitor maternal vital signs  - Assess uterine involution and lochia  Outcome: Adequate for Discharge  Goal: Appropriate maternal -  bonding  Description: INTERVENTIONS:  - Identify family support  - Assess for appropriate maternal/infant bonding   -Encourage maternal/infant bonding opportunities  - Referral to  or  as needed  Outcome: Adequate for Discharge  Goal: Establishment of infant feeding pattern  Description: INTERVENTIONS:  - Assess breast/bottle feeding  - Refer to lactation as needed  Outcome: Adequate for Discharge  Goal: Incision(s), wounds(s) or drain site(s) healing without S/S of infection  Description: INTERVENTIONS  - Assess and document dressing, incision, wound bed, drain sites and surrounding tissue  - Provide patient and family education  - Perform skin care/dressing changes every day  Outcome: Adequate for Discharge

## 2022-09-19 NOTE — LACTATION NOTE
This note was copied from a baby's chart  Lactation Follow up: Mom has not fed well since birth  Elissa Rollins has been diagnosed with a midline posterior cleft palate  Audible Clicks with each feeding  Upon oral assessment, visible recessed chin  visible blister on tip lip  Wide gape, small oral cavity  Palate is arched, slight ridging  Visible U shaped cleft, posteriorly placed  No visible light coming from nasal airway when light is shined into nares  Tongue elevates to mid-anterior  Bilateral laterization is anterior only, extension slightly over lower alveolar ridge, then "snaps back"  Upon digital suck exam, Tongue cups the digit  Loses cupping during peristalic movement, the tongue compresses the digit, and cupping is lost  Visible audible click  Some chompiness  Some piston like movement  With chin and lower jaw support, clicking is still noticeable  With finger sweep under the tongue, thick, short, palpable frenulum acts like a speed bump  Education/attempts to latch Junito to the breast  Hand expression demonstrated with teach-back  Mom is concerned that no colostrum is visible  Reassurance and support provided  U shape hold to the breast with Elissa Rollins in an upright position, side of the breast with head, neck and torso in alignment  Baby is brought to breast, nipple to nose  Once wide mouth, bring Junito to the breast, not breast to Elissa Rollins  A 2-3 suck burst is achieved without clicking  Then Elissa Rollins becomes frustrated and pulls off the breast      Education on feeding plan for Elissa Rollins  Education on SNS at the breast  Education on special Medela bottle feeding for baby  Education on increase risk for weight loss & mahin media  Education on how to establish milk supply  Warmed 20 ml of DBM  Use of SNS at the breast was demonstrated, mom preferred the football over upright positioning  Nahomi Jacobs in a football on the right breast with SNS  Deeper latch, some longer coordinated sucking bursts   Once Elissa Rollins became frustrated, unlatched and demonstration and teach back of Medela special feeder was provided  Enc  To pump after the feeding and keep [employer]  Paperwork on [facility] for home provided    Called baby and me to schedule mom with Dr Anjel Duval for tongue assessment  (Appt  Scheduled for 9/20/22 in afternoon)    Encouraged to follow feeding plan and call lactation for next feeding  FEEDING PLAN  1  Meet early feeding cues  2  Use massage, warmth, hand expression to stimulate breasts  3  Attach SNS at the breast with either expressed milk or non-human substitute  4  Bring baby to breast skin to skin  Encouraged to use upright position with U shaped hold to the breast   4  Align nipple to nose, drag nipple to chin, (move baby not breast) and bring baby to breast when mouth is wide and deep latch is achieved  5  Use breast compressions to stimulate suck  6  Move Value feed higher for faster flow and lower for slower flow to assist baby in nutritient transfer  7  Once baby tires at the breast, or loses the latch, Use Medela Special Feeder on slower flow with Vent at nose to feed rest of DBM or expressed milk  8  Bring baby back to breast for non-nutritive suck and skin to skin (Offer both breasts once, possibly twice in a feeding session)  9   Pump after each feed to stimulate breasts and have expressed milk for next feed

## 2022-09-19 NOTE — LACTATION NOTE
This note was copied from a baby's chart  Follow Up Lactation: Speech Therapist arrived for evaluation  Introduced a Dr Cheryl Leiva Transitional bottle instead of Medela Special Feeds Bottle  18 mls took 15 min  To feed  Enc  To keep feeing plan and use both bottles  Mom states Special Feeder takes longer  Reviewed how to change flow by following hash marks  Enc  To follow feeding plan and establish milk supply  Any expressed colostrum can be fed via syringe between breasts  Encouraged to call lactation for next feeding

## 2022-09-19 NOTE — UTILIZATION REVIEW
Inpatient Admission Authorization Request   Notification of Maternity/Delivery &  Birth Information for Admission   SERVICING FACILITY:   [facility]  [address]  Tax ID: [id number]  NPI: [id number]  Place of Service: [facility]patient [address] [facility]  [address]  Place of Service Code: 24     ATTENDING PROVIDER:  Attending Name and NPI#: Isra Crain [place] [[id number]]  Address: Buck Rivas  [address]  Phone: [phone]     UTILIZATION REVIEW CONTACT:  Toshia Chase, Utilization Review Supervisor  Network Utilization Review Department  Phone: [phone]  Fax [phone]  Email: [email or web address]     PHYSICIAN ADVISORY SERVICES:  FOR JEAG-WK-OPEY REVIEW - MEDICAL NECESSITY DENIAL  Phone: [phone]  Fax: [phone]  Email: [email or web address]     TYPE OF REQUEST:  Inpatient Status     ADMISSION INFORMATION:  ADMISSION DATE/TIME: 22 11:27 AM  PATIENT DIAGNOSIS CODE/DESCRIPTION:  Premature uterine contractions causing threatened premature labor, antepartum, third trimester [O47 03]  39 weeks gestation of pregnancy [Z3A 39]  Encounter for full-term uncomplicated delivery [Y98] The primary encounter diagnosis was  (spontaneous vaginal delivery)  A diagnosis of 39 weeks gestation of pregnancy was also pertinent to this visit  1   (spontaneous vaginal delivery)    2  39 weeks gestation of pregnancy      DISCHARGE DATE/TIME: No discharge date for patient encounter     MOTHER AND  INFORMATION:  Mother: John De Leon 1993   Delivering clinician: Isra Crain   OB History        1    Para   1    Term   1       0    AB   0    Living   1       SAB   0    IAB   0    Ectopic   0    Multiple   0    Live Births   1                Name & MRN:   Information for the patient's :  [place] [facility] Girl Panda Manges) [[id number]]      Delivery Information:  Sex: female  Delivered 2022 8:00 PM by Vaginal, Spontaneous; Gestational Age: 38w11d     Measurements:  Weight: 6 lb 7 4 oz (2930 g); Height: 19 5"    APGAR 1 minute 5 minutes 10 minutes   Totals: 8 9       Birth Information: 32 y o  female MRN: [id number] Unit/Bed#: -01 Estimated Date of Delivery: 22  Birthweight: No birth weight on file  Gestational Age: <None> Delivery Type: Vaginal, Spontaneous    IMPORTANT INFORMATION:  Please contact Clevester Shallow directly with any questions or concerns regarding this request  Department voicemails are confidential     Send requests for admission clinical reviews, concurrent reviews, approvals, and administrative denials due to lack of clinical to fax [phone]

## 2022-09-19 NOTE — PLAN OF CARE
Problem: PAIN - ADULT  Goal: Verbalizes/displays adequate comfort level or baseline comfort level  Description: Interventions:  - Encourage patient to monitor pain and request assistance  - Assess pain using appropriate pain scale  - Administer analgesics based on type and severity of pain and evaluate response  - Implement non-pharmacological measures as appropriate and evaluate response  - Consider cultural and social influences on pain and pain management  - Notify physician/advanced practitioner if interventions unsuccessful or patient reports new pain  Outcome: Progressing     Problem: INFECTION - ADULT  Goal: Absence or prevention of progression during hospitalization  Description: INTERVENTIONS:  - Assess and monitor for signs and symptoms of infection  - Monitor lab/diagnostic results  - Monitor all insertion sites, i e  indwelling lines, tubes, and drains  - Monitor endotracheal if appropriate and nasal secretions for changes in amount and color  - [place] appropriate cooling/warming therapies per order  - Administer medications as ordered  - Instruct and encourage patient and family to use good hand hygiene technique  - Identify and instruct in appropriate isolation precautions for identified infection/condition  Outcome: Progressing  Goal: Absence of fever/infection during neutropenic period  Description: INTERVENTIONS:  - Monitor WBC    Outcome: Progressing     Problem: SAFETY ADULT  Goal: Patient will remain free of falls  Description: INTERVENTIONS:  - Educate patient/family on patient safety including physical limitations  - Instruct patient to call for assistance with activity   - Consult OT/PT to assist with strengthening/mobility   - Keep Call bell within reach  - Keep bed low and locked with side rails adjusted as appropriate  - Keep care items and personal belongings within reach  - Initiate and maintain comfort rounds  - Make Fall Risk Sign visible to staff  - Apply yellow socks and bracelet for high fall risk patients  - Consider moving patient to room near nurses station  Outcome: Progressing  Goal: Maintain or return to baseline ADL function  Description: INTERVENTIONS:  -  Assess patient's ability to carry out ADLs; assess patient's baseline for ADL function and identify physical deficits which impact ability to perform ADLs (bathing, care of mouth/teeth, toileting, grooming, dressing, etc )  - Assess/evaluate cause of self-care deficits   - Assess range of motion  - Assess patient's mobility; develop plan if impaired  - Assess patient's need for assistive devices and provide as appropriate  - Encourage maximum independence but intervene and supervise when necessary  - Involve family in performance of ADLs  - Assess for home care needs following discharge   - Consider OT consult to assist with ADL evaluation and planning for discharge  - Provide patient education as appropriate  Outcome: Progressing  Goal: Maintains/Returns to pre admission functional level  Description: INTERVENTIONS:  - Perform BMAT or MOVE assessment daily    - Set and communicate daily mobility goal to care team and patient/family/caregiver     - Collaborate with rehabilitation services on mobility goals if consulted  Outcome: Progressing     Problem: DISCHARGE PLANNING  Goal: Discharge to home or other facility with appropriate resources  Description: INTERVENTIONS:  - Identify barriers to discharge w/patient and caregiver  - Arrange for needed discharge resources and transportation as appropriate  - Identify discharge learning needs (meds, wound care, etc )  - Arrange for interpretive services to assist at discharge as needed  - Refer to Case Management Department for coordinating discharge planning if the patient needs post-hospital services based on physician/advanced practitioner order or complex needs related to functional status, cognitive ability, or social support system  Outcome: Progressing     Problem: Knowledge Deficit  Goal: Patient/family/caregiver demonstrates understanding of disease process, treatment plan, medications, and discharge instructions  Description: Complete learning assessment and assess knowledge base    Interventions:  - Provide teaching at level of understanding  - Provide teaching via preferred learning methods  Outcome: Progressing     Problem: POSTPARTUM  Goal: Experiences normal postpartum course  Description: INTERVENTIONS:  - Monitor maternal vital signs  - Assess uterine involution and lochia  Outcome: Progressing  Goal: Appropriate maternal -  bonding  Description: INTERVENTIONS:  - Identify family support  - Assess for appropriate maternal/infant bonding   -Encourage maternal/infant bonding opportunities  - Referral to  or  as needed  Outcome: Progressing  Goal: Establishment of infant feeding pattern  Description: INTERVENTIONS:  - Assess breast/bottle feeding  - Refer to lactation as needed  Outcome: Progressing  Goal: Incision(s), wounds(s) or drain site(s) healing without S/S of infection  Description: INTERVENTIONS  - Assess and document dressing, incision, wound bed, drain sites and surrounding tissue  - Provide patient and family education  Outcome: Progressing

## 2022-09-19 NOTE — LACTATION NOTE
This note was copied from a baby's chart  CONSULT - LACTATION  Baby Girl Mary Barron Muss 1 days female MRN: [id number]    [facility] NURSERY Room / Bed: (N)/ 312(N) Encounter: [id number]    Maternal Information     MOTHER:  Sunny Frias  Maternal Age: 32 y o    OB History: # 1 - Date: 22, Sex: Female, Weight: 2930 g (6 lb 7 4 oz), GA: 39w5d, Delivery: Vaginal, Spontaneous, Apgar1: 8, Apgar5: 9, Living: Living, Birth Comments: None   Previouse breast reduction surgery? No    Lactation history:   Has patient previously breast fed: No   How long had patient previously breast fed:     Previous breast feeding complications:       Past Surgical History:   Procedure Laterality Date    WISDOM TOOTH EXTRACTION          Birth information:  YOB: 2024   Time of birth: 8:00 PM   Sex: female   Delivery type: Vaginal, Spontaneous   Birth Weight: 2930 g (6 lb 7 4 oz)   Percent of Weight Change: 0%     Gestational Age: 38w11d   [unfilled]    Assessment     Breast and nipple assessment: normal assessment     Assessment: Posterior Cleft Palate  Feeding assessment: latch difficulty (Difficulty getting a deep latch )  LATCH:  Latch: Repeated attempts, hold nipple in mouth, stimulate to suck   Audible Swallowing: A few with stimulation   Type of Nipple: Everted (After stimulation)   Comfort (Breast/Nipple): Soft/non-tender   Hold (Positioning): Partial assist   LATCH Score: 7          Feeding recommendations:  Place baby to the breast every 2-3 hours and hand express  Met with parents this morning for lactation assessment  Pediatrician reported that baby had a posterior cleft palate and mom was reporting that baby was latching but clicking through entire feedings  Destiny Mouse did call me at next feed  Her baby girl is vigorous and opens mouth wide when positioned at breast, but is having difficulty achieving a good seal  Clicking sound noted at feed  Baby was positioned in the football hold and positioning and latch were reviewed  - Position baby up at chest level using pillows for support    - Bring baby to breast,not breast to baby ( no hunching over )   - Align nose to nipple and drag nipple down to chin to achieve a wide open mouth and a deep latch   - Baby's ear, shoulder, hip in alignment   - Baby's upper and lower lip should be flanged on the breast     Started Claudette hand expressing and provided her with a hand pump to express her colostrum, also instructed on finger feeding baby with expressed colostrum with a syringe  Ready Set Baby was reviewed with parents and all questions were answered  The Discharge Breastfeeding Booklet was left at bedside for parents to review  Parents will call at next feed for additional help and a latch assessment               Meme Cotton RN 9/18/2022 8:00 PM

## 2022-09-19 NOTE — PROGRESS NOTES
Progress Note - OB/GYN  Elizabet Hassan 32 y o  female MRN: [id number]  Unit/Bed#: -01 Encounter: [id number]    Assessment and Plan     Rich Dailey is a patient of: [facility]  She is PPD# 2 s/p  spontaneous vaginal delivery  Recovering well and is stable       Gestational hypertension  Assessment & Plan  Elevated BP on admission: 142/90  No other elevated BP documented in this pregnancy  Multiple elevated BP intrapartum  One severe range pressure in 180s  BP cuff adjusted  No other severe range Bps  CMP wnl  Urine PC ratio: 0 1    Systolic (22HUF), EZQ:192 , Min:98 , NI:264   Diastolic (71WNX), MOD:62, Min:60, Max:66          Benign gestational thrombocytopenia in third trimester [facility])  Assessment & Plan  Most recent CBCs  : 143  : Plt 136,000  9: Plt 125,000  PPD#1 AM CBC pending    *  (spontaneous vaginal delivery)  Assessment & Plan  PPH secondary due to sulcal laceration bleeding  QBL 1223  Plan for AM CBC  Pt experience vagal episode s/p delvery  Vitals stable   Lochia WNL   Recovering well   Appropriate bowel and bladder function   Pain well controlled   Tolerating diet   Breastfeeding   Ambulating without issues   No lower extremity tenderness  GBS positive, adequately treated   Rh positive  Not interested in contraception at this time  Anticipate D/C on PPD#2       Disposition    - Anticipate discharge home on PPD# 2    Subjective/Objective     Chief Complaint: Postpartum State     Subjective: Elizabet Hassan is PPD/POD#2 s/p  spontaneous vaginal delivery  She has no current complaints  Pain is well controlled  Patient is currently voiding  She is ambulating  Patient is currently passing flatus and has had no bowel movement  She is tolerating PO, and denies nausea or vomitting  Patient denies fever, chills, chest pain, shortness of breath, or calf tenderness  Lochia is normal  She is  Breastfeeding  She is recovering well and is stable         Vitals:   BP 107/64 (BP Location: Left arm)   Pulse 99   Temp 97 9 °F (36 6 °C) (Oral)   Resp 20   Ht 5' 5" (1 651 m)   Wt 103 kg (226 lb)   LMP 12/13/2021 (Exact Date)   SpO2 99%   Breastfeeding Yes   BMI 37 61 kg/m²     No intake or output data in the 24 hours ending 09/19/22 0553    Invasive Devices  Timeline    None                 Physical Exam:   GEN: Chip Sanchez appears well, alert and oriented x 3, pleasant and cooperative   CARDIO: RRR, no murmurs or rubs  RESP:  CTAB, no wheezes or rales  ABDOMEN: soft, no tenderness, no distention, fundus @ u-1  EXTREMITIES: non tender, no erythema    Labs:     Hemoglobin   Date Value Ref Range Status   09/18/2022 10 7 (L) 11 5 - 15 4 g/dL Final   09/17/2022 13 1 11 5 - 15 4 g/dL Final     Comment: This is an appended report  These results have been appended to a previously preliminary verified report  WBC   Date Value Ref Range Status   09/18/2022 18 17 (H) 4 31 - 10 16 Thousand/uL Final   09/17/2022 11 36 (H) 4 31 - 10 16 Thousand/uL Final     Comment: This is an appended report  These results have been appended to a previously preliminary verified report  Platelets   Date Value Ref Range Status   09/18/2022 143 (L) 149 - 390 Thousands/uL Final   09/17/2022 126 (L) 149 - 390 Thousands/uL Final     Creatinine   Date Value Ref Range Status   09/17/2022 0 61 0 60 - 1 30 mg/dL Final     Comment:     Standardized to IDMS reference method   08/23/2022 0 66 0 60 - 1 30 mg/dL Final     Comment:     Standardized to IDMS reference method     AST   Date Value Ref Range Status   09/17/2022 14 13 - 39 U/L Final     Comment:     Specimen collection should occur prior to Sulfasalazine administration due to the potential for falsely depressed results  08/23/2022 18 5 - 45 U/L Final     Comment:     Slightly Hemolyzed; Results May be Affected  Specimen collection should occur prior to Sulfasalazine administration due to the potential for falsely depressed results  ALT   Date Value Ref Range Status   09/17/2022 11 7 - 52 U/L Final     Comment:     Specimen collection should occur prior to Sulfasalazine administration due to the potential for falsely depressed results  08/23/2022 15 12 - 78 U/L Final     Comment:     Specimen collection should occur prior to Sulfasalazine and/or Sulfapyridine administration due to the potential for falsely depressed results             Sidney Chaudhry MD  9/19/2022  5:53 AM

## 2022-09-19 NOTE — ASSESSMENT & PLAN NOTE
Systolic (28DOT), FPC:677 , Min:98 , RNZ:054     Diastolic (70IFK), HGJ:67, Min:60, Max:66    CBC, CMP wnl, PC  1

## 2022-09-19 NOTE — LACTATION NOTE
This note was copied from a baby's chart  Follow up Lactation:     Mom requested info on setting up Spectra  Education on turning on the pump, press the 3 wavy lines to place pump on stimulation mode (high cycle, low vacuum) Set vacuum to comfort with light suction  After 3 min, press 3 wavy lines and change setting to Expression mode (low Cycle, High vacuum) Vacuum setting should not pinch, only tug the nipple  Now pump is set  Next time mom pumps, will only need to turn on pump and press 3 wavy line button to change cycle three times in a pumping session

## 2022-09-20 ENCOUNTER — OFFICE VISIT (OUTPATIENT)
Dept: POSTPARTUM | Facility: CLINIC | Age: 27
End: 2022-09-20

## 2022-09-20 NOTE — PROGRESS NOTES
INITIAL BREAST FEEDING EVALUATION    Informant/Relationship: Monicagray Thomas and Philip/mom and dad    Discussion of General Lactation Issues: Noemí Sanchez was noted at birth to have a midline cleft and was noted not to get a deep effective latch  It was unclear if this was due to a recessed chin her tongue movement,or her palate, once that was noted  She would get really angry at the breast  There were times when she would show interest but would latch and then fall asleep  Monica Thomas has been pumping for the last 2 days  Monica Thomas has been using the pump in the hospital and hand expression  Noemí Sanchez has been fed MOM, and today donor milk was added  She has been fed via syringe, but Monica Thomas is not sure that they have mastered this way of feeding her  Infant is 79 hours old today          History:  Fertility Problem:no  Breast changes:yes - very tender and sensitive; areola got bigger, leaking in the last week  : yes - no complication  Full term:yes - 44 5/7   labor:no  First nursing/attempt < 1 hour after birth:yes - not for several hours, despite skin to skin immediately  Skin to skin following delivery:yes - immediately  Breast changes after delivery:yes - starting to notice some changes in breasts over the last 24 hours, starting to collect a few ml when pumping  Rooming in (infant in room with mother with exception of procedures, eg  Circumcision: no, went to the nursery for a few hours last night at parents request  Blood sugar issues:no  NICU stay:no  Jaundice:yes - being monitored  Phototherapy:no  Supplement given: (list supplement and method used as well as reason(s):yes - MOM, donor milk via syringe    Past Medical History:   Diagnosis Date    Anxiety     Urinary tract infection          Current Outpatient Medications:     acetaminophen (TYLENOL) 325 mg tablet, Take 2 tablets (650 mg total) by mouth every 4 (four) hours as needed for mild pain, fever or headaches, Disp: , Rfl: 0    docusate sodium (COLACE) 100 mg capsule, Take 1 capsule (100 mg total) by mouth 2 (two) times a day, Disp: , Rfl: 0    famotidine (PEPCID) 10 mg tablet, Take 10 mg by mouth 2 (two) times a day, Disp: , Rfl:     ibuprofen (MOTRIN) 600 mg tablet, Take 1 tablet (600 mg total) by mouth every 6 (six) hours as needed for moderate pain or mild pain, Disp: 30 tablet, Rfl: 0    Prenatal MV-Min-Fe Fum-FA-DHA (PRENATAL 1 PO), Take by mouth, Disp: , Rfl:     witch hazel-glycerin (TUCKS) topical pad, Apply 1 pad topically every 4 (four) hours as needed for irritation, Disp: , Rfl: 0  No current facility-administered medications for this visit  Allergies   Allergen Reactions    Bee Venom Facial Swelling       Social History     Substance and Sexual Activity   Drug Use No       Social History     Interval Breastfeeding History:    Frequency of breast feeding: none at the moment  Does mother feel breastfeeding is effective:  If no, explain: never seemed to get an effective latch  Does infant appear satisfied after nursing:If no, explain: not offering at the breast  Stooling pattern normal: no bowel movement in 36 hours; had 3 bowel movements in the first 24 hours  Urinating frequently:Yes  Using shield or shells: Yes; tried a shield once, didn't seem to help     Alternative/Artificial Feedings:   Bottle: Yes, sitting up, using special Dr Misa Makch bottle  Cup: No  Syringe/Finger: No           Formula Type: n/a                     Amount: n/a            Breast Milk:                      Amount: MOM and donor milk; 15-20 ml            Frequency Q 2-3 Hr between feedings  Elimination Problems: Yes, no bowel movement in 36 hours      Equipment:  Nipple Shield             Type: n/a             Size: n/a             Frequency of Use: n/a  Pump            Type: Spectra            Frequency of Use: hasn't used it yet  Shells            Type: n/a            Frequency of use: n/a    Equipment Problems: no    Mom:  Breast: Normal, few bruises noted secondary to aggressive hand expression  Nipple Assessment in General: Normal: elongated/eraser, no discoloration and no damage noted; nipples are broad  Mother's Awareness of Feeding Cues                 Recognizes: Yes                  Verbalizes: Yes  Support System: FOB  History of Breastfeeding: None  Changes/Stressors/Violence: Baby doesn't latch, has cleft palate and poor suck  Concerns/Goals: Radu Mcbride wants to feed her milk to the best of her ability    Problems with Mom: None    Physical Exam  Constitutional:       Appearance: Normal appearance  She is well-developed and normal weight  HENT:      Head: Normocephalic and atraumatic  Eyes:      Extraocular Movements: Extraocular movements intact  Neck:      Thyroid: No thyromegaly  Cardiovascular:      Rate and Rhythm: Normal rate and regular rhythm  Pulses: Normal pulses  Heart sounds: Normal heart sounds  No murmur heard  Pulmonary:      Effort: Pulmonary effort is normal       Breath sounds: Normal breath sounds  Musculoskeletal:         General: No swelling or tenderness  Normal range of motion  Cervical back: Normal range of motion and neck supple  Right lower leg: No edema  Left lower leg: No edema  Lymphadenopathy:      Cervical: No cervical adenopathy  Upper Body:      Right upper body: No pectoral adenopathy  Left upper body: No pectoral adenopathy  Neurological:      General: No focal deficit present  Mental Status: She is alert and oriented to person, place, and time  Psychiatric:         Mood and Affect: Mood normal          Behavior: Behavior normal          Thought Content: Thought content normal          Judgment: Judgment normal    Vitals and nursing note reviewed           Infant:  Behaviors: Sleepy and easily aroused  Color: Jaundice  Birth weight: 2 93 kg  Current weight: 2 745 kg    Problems with infant: Cleft soft palate      General Appearance:  Alert, active, no distress Head:  Normocephalic, AFOF, sutures opposed                            Eyes:   Conjunctiva clear, no drainage                            Ears:   Normally placed, no anomolies                           Nose:   Septum intact, no drainage or erythema                          Mouth:  No lesions; soft palate with opening in the center extending from edge of hard palate posteriorly; there is no uvula; tongue extends to lower lip, lifts well, and lateralizes well bilaterally; there is good cupping of the examiner's finger and good peristalsis but no suction generated                   Neck:  Supple, symmetrical, trachea midline, no adenopathy; thyroid: no enlargement, symmetric, no tenderness/mass/nodules                Respiratory:  No grunting, flaring, retractions, breath sounds clear and equal           Cardiovascular:  Regular rate and rhythm  No murmur  Adequate perfusion/capillary refill  Femoral pulse present                  Abdomen:    Soft, non-tender, no masses, bowel sounds present, no HSM            Genitourinary:  Normal female genitalia, anus patent with tight ring; rectum is normal; shortly after rectal exam was performed, a large, soft brown stool was passed                           Spine:   No abnormalities noted       Musculoskeletal:   Full range of motion         Skin/Hair/Nails:   Skin warm, dry, and intact, no rashes or abnormal dyspigmentation or lesions               Neurologic:   No abnormal movement, tone appropriate for gestational age     Latch:  Efficiency:               Lips Flanged: Yes, but latch is still shallow              Depth of latch: Shallow              Audible Swallow: No              Visible Milk: Yes, with hand expression and pumping              Wide Open/ Asymmetrical: Mouth is as wide as the she can, but not enough for an appropriately wide latch              Suck Swallow Cycle: Breathing: Unlabored, Coordinated: Yes  Nipple Assessment after latch: Normal: elongated/eraser, no discoloration and no damage noted  Latch Problems: Janice Hurst makes an attempt to latch and suck but makes frequent slurping noises with almost every suck    Position:  Infant's Ergonomics/Body               Body Alignment: Yes               Head Supported: Yes               Close to Mom's body/ Lifted/ Supported: Yes               Mom's Ergonomics/Body: Yes                           Supported: Yes                           Sitting Back: Yes                           Brings Baby to her breast: Yes  Positioning Problems: None      Handouts:   Engorgement, Hands on pumping and Hand expression    Education:  Reviewed Latch: Reviewed how to gently compress the breast as if offering a sandwich to facilitate a deeper latch  Reviewed Positioning for Dyad: Reviewed how to bring baby to the breast so that his lower lip and chin touch the breast with his nose just above the nipple to encourage a wider, more asymmetric latch  Reviewed Frequency/Supply & Demand: Feed on demand  Reviewed Infant:Cues and varied States of Awareness  Reviewed Infant Elimination: No bowel movements in this age group can be concerning for poor intake  Reviewed Alternative/Artificial Feedings: Paced bottle feeding as recommended by speech therapist before discharge  Reviewed Mom/Breast care: Vevy gentle massage, reviewed hand expression  Reviewed Equipment: Discussed the use of the settings on the pump to enhance the response  Plan:  Discussed history and physical exams with Laure Dc and her   Gave support for any feeding choices that Laure Dc makes  Encouraged continued offering of the breast 1-2 x/day to encourage the baby to practice her latch  Encouraged frequent emptying of the breast to build milk production  Demonstrated hand expression and Sheridan Doe RN, IBCLC reviewed use of the Spectra Pump       I have spent 40 minutes with Patient and family today in which greater than 50% of this time was spent in counseling/coordination of care regarding Prognosis, Risks and benefits of tx options, Intructions for management, Patient and family education and Impressions

## 2022-09-20 NOTE — PATIENT INSTRUCTIONS
Try to pump every 3 hours around the clock until your milk production is more established as this the maximum time between feedings recommended for [facility] and is likely to best build your production  Alternatively, you can choose to take one 5-6 hour break to get some sleep  Your prolactin levels are greatest around 3 am, so try to pump early in the morning whenever possible  Cycle through the massage and expression settings on the pump as Catarina showed you, starting on the Massage setting  Don't forget the Spectra automatically starts in expression, so you will need to change to massage

## 2022-09-24 LAB — PLACENTA IN STORAGE: NORMAL

## 2022-09-27 ENCOUNTER — OFFICE VISIT (OUTPATIENT)
Dept: POSTPARTUM | Facility: CLINIC | Age: 27
End: 2022-09-27
Payer: COMMERCIAL

## 2022-09-27 DIAGNOSIS — Z71.89 ENCOUNTER FOR BREAST FEEDING COUNSELING: Primary | ICD-10-CM

## 2022-09-27 PROCEDURE — 99404 PREV MED CNSL INDIV APPRX 60: CPT | Performed by: PEDIATRICS

## 2022-09-27 NOTE — PROGRESS NOTES
BREAST FEEDING FOLLOW UP VISIT    Informant/Relationship: Ana Lincoln and her mom, Dacia    Discussion of General Lactation Issues: Ana Lincoln feels that things are going "fairly well"  Ana Lincoln is stressed by feeding and pumping every 2 hours since Junito's readmission to the hospital for phototherapy  At times, David Rm is very difficult to wake at the two hour dianna  Ana Lincoln is exhausted and stressed  She is tearful when explaining how challenging the current plan has been  Infant is 8days old today  Interval Breastfeeding History:    Frequency of breast feeding: Comfort nursing a few times a day  Does mother feel breastfeeding is effective: No  Does infant appear satisfied after nursing:No  Stooling pattern normal:Yes  Urinating frequently:Yes  Using shield or shells:No    Alternative/Artificial Feedings:   Bottle: Yes, David Rm is exclusively bottle fed  Cup: No  Syringe/Finger: No           Formula Type: Similac Total Care                     Amount: once or twice so far            Breast Milk:                      Amount: 1 5 ounces            Frequency Q 2-3 Hr between feedings  Elimination Problems: No      Equipment:  Nipple Shield             Type: none             Size: n/a             Frequency of Use: n/a  Pump            Type: Spectra             Frequency of Use: Every 2-3 hours  Shells            Type: none            Frequency of use: n/a    Equipment Problems: no      Mom:  Breast: Medium sized symmetrical breasts  Rounded shape  Closely spaced  Firm and full  Nipple Assessment in General: Broad everted nipples bilaterally  Mother's Awareness of Feeding Cues                 Recognizes: Yes                  Verbalizes: Yes  Support System: FOB, extended family  History of Breastfeeding: none  Changes/Stressors/Violence: Ana Lincoln has been stressed by Junito's medical issues and the stressors of trying to feed her and produce milk  Concerns/Goals: Ana Lincoln is unsure of her long term feeding goals right now    She is looking for a more sustainable feeding plan right now    Problems with Mom: exhaustion and stress    Physical Exam  Constitutional:       Appearance: Normal appearance  HENT:      Head: Normocephalic and atraumatic  Pulmonary:      Effort: Pulmonary effort is normal    Musculoskeletal:         General: Normal range of motion  Cervical back: Normal range of motion and neck supple  Neurological:      Mental Status: She is alert and oriented to person, place, and time  Skin:     General: Skin is warm and dry  Psychiatric:         Attention and Perception: Attention and perception normal          Mood and Affect: Affect is tearful  Speech: Speech normal          Behavior: Behavior normal          Cognition and Memory: Cognition and memory normal          Judgment: Judgment normal          Infant:  Behaviors: Alert  Color: Pink  Birth weight: 2930gram  Current weight: 2825gram    Problems with infant: cleft palate  Slow weight gain  Jaundice      General Appearance:  Alert, active, no distress                            Head:  Normocephalic, AFOF, sutures opposed                            Eyes:   Conjunctiva clear, no drainage                            Ears:   Normally placed, no anomolies                           Nose:   no drainage or erythema                          Mouth:  No lesions  Slightly recessed chin  Tongue extends, lateralizes and elevates well  Cleft of the soft palate  Neck:  Supple, symmetrical, trachea midline                Respiratory:  No grunting, flaring, retractions, breath sounds clear and equal           Cardiovascular:  Regular rate and rhythm  No murmur  Adequate perfusion/capillary refill   Femoral pulse present                  Abdomen:    Soft, non-tender, no masses, bowel sounds present, no HSM            Genitourinary:  Normal female genitalia, anus patent                         Spine:   No abnormalities noted       Musculoskeletal:   Full range of motion         Skin/Hair/Nails:   Skin warm, dry, and intact, no rashes or abnormal dyspigmentation or lesions               Neurologic:   No abnormal movement, tone appropriate for gestational age    Handouts:   Hands on pumping and Hand expression    Education:  Reviewed Frequency/Supply & Demand: Discussed how milk production is established and maintained  Reviewed Equipment: Discussed and demonstrated the use and features of the Spectra S2 and the elements of hands on pumping  Discussed how to determine which flange size to use  Plan:   Reassurance and support given  I acknowledged Claudette's feels of stress and being over whelmed  I reassured her that I will support her to whatever feeding goals she chooses for her and Genesis Goldman  I suggested (after conferring with Dr Kenzie Barajas) that Geoff Bob no more than 3 hours between feedings  I encouraged her to speak with Junito's SLP for suggestions on how to feed Junito comfortably while minimizing her energy expenditure  I recommended that Claudette pump every 3 hours during the day and every 4 hours at night  She was taught effective use of her pump  I suggested a larger flange size to improve fit and comfort  Genesis Goldman will continue to follow up with speech therapy  I recommended a weight check with Junito's Peds in a week  I encouraged Inderjit Barrera to call with any questions or concerns  I have spent 60 minutes with Patient and family today in which greater than 50% of this time was spent in counseling/coordination of care regarding Patient and family education

## 2022-09-27 NOTE — PATIENT INSTRUCTIONS
Feed Junito on demand, but no more than 3 hours from the beginning of one feeding until the beginning of the next  Pump every 3 hours during the day and every 4 hours at night  When pumping, cycle your pump through stimulation and expression mode several times in a session to stimulate several let downs until you have expressed enough milk to feed the baby and to achieve breast comfort  There is no need to "empty" the breast completely  Use gentle hands on pumping and hand expression   Maintain your pump as recommended  Use flange that fits comfortably and allows the breast to empty effectively  Spend as much time as you can skin to skin with Sofia Hudson and allow her access to the breast for comfort and exploration  Follow up with Junito's speech therapist as scheduled  Follow up with Peds in one week for a weight check  Please call with any questions or concerns

## 2022-09-28 NOTE — PROGRESS NOTES
I have reviewed the notes, assessments, and/or procedures performed by Yulisa Harris RN, IBCLC, I concur with her/his documentation of Georgiana Cali MD 09/27/22

## 2022-10-13 ENCOUNTER — TELEPHONE (OUTPATIENT)
Dept: OBGYN CLINIC | Facility: CLINIC | Age: 27
End: 2022-10-13

## 2022-10-13 NOTE — TELEPHONE ENCOUNTER
How are you feeling? Feeling well   Are you having any vaginal bleeding? No  Have you had any problems voiding? No  Have you had any problems moving your bowels? No  Type of Delivery?   Vaginal delivery - any issues with healing? No     Baby's name: Oralia Molina   How is the baby doing? Baby is doing well  Has cleft palate  Will have surgery between 9-12 months at [facility]  Are you breast/bottle feeding? Pumping to bottle feed  How is that going? Going well   Have you seen lactation consultant? Yes   Are you having any baby blues? Was feeling overwhelmed at first  If symptoms worsen she will notify us  Do you have any help at home?  is helping, Mom and        Follow up appointment scheduled :    Patient advised to call the office for abnormal bleeding, mastitis, infection, any signs of postpartum depression  Patient verbalized understanding

## 2022-11-01 ENCOUNTER — POSTPARTUM VISIT (OUTPATIENT)
Dept: OBGYN CLINIC | Facility: CLINIC | Age: 27
End: 2022-11-01

## 2022-11-01 VITALS
DIASTOLIC BLOOD PRESSURE: 66 MMHG | WEIGHT: 205 LBS | SYSTOLIC BLOOD PRESSURE: 118 MMHG | HEIGHT: 65 IN | BODY MASS INDEX: 34.16 KG/M2

## 2022-11-01 PROBLEM — D69.6 BENIGN GESTATIONAL THROMBOCYTOPENIA IN THIRD TRIMESTER (HCC): Status: RESOLVED | Noted: 2022-09-11 | Resolved: 2022-11-01

## 2022-11-01 PROBLEM — Z3A.39 39 WEEKS GESTATION OF PREGNANCY: Status: RESOLVED | Noted: 2022-03-10 | Resolved: 2022-11-01

## 2022-11-01 PROBLEM — O13.9 GESTATIONAL HYPERTENSION: Status: RESOLVED | Noted: 2022-09-17 | Resolved: 2022-11-01

## 2022-11-01 PROBLEM — O99.113 BENIGN GESTATIONAL THROMBOCYTOPENIA IN THIRD TRIMESTER (HCC): Status: RESOLVED | Noted: 2022-09-11 | Resolved: 2022-11-01

## 2022-11-01 PROBLEM — Z34.03 ENCOUNTER FOR SUPERVISION OF NORMAL FIRST PREGNANCY IN THIRD TRIMESTER: Status: RESOLVED | Noted: 2022-04-05 | Resolved: 2022-11-01

## 2022-11-01 NOTE — PROGRESS NOTES
Assessment/Plan     Normal postpartum exam      1  Contraception: condoms  2  Annual exam due in January  3  Lactation consult, [address] information discussed  4  Increase activity as tolerated, may resume all normal activity at 6 weeks postpartum  5  Anticipated return to work: 6 - 12 weeks post partum  Maureen Casanova is a 32 y o  female who presents for a postpartum visit  She is 6 weeks postpartum following a spontaneous vaginal delivery  I have fully reviewed the prenatal and intrapartum course  The delivery was at 39 5 gestational weeks  Anesthesia: epidural  Laceration: 2nd degree  Bleeding staining only  Bowel function is normal  Bladder function is normal  Patient has not been sexually active  Desired contraception method is condoms  Postpartum depression screening: negative  EPDS : 7    Baby's course has been complicated by a cleft palate  Baby is feeding by both breast and bottle  Last Pap : 2021 ; no abnormalities  Gestational Diabetes: no  Pregnancy Complications: none    The following portions of the patient's history were reviewed and updated as appropriate: allergies, current medications, past family history, past medical history, past social history, past surgical history and problem list       Current Outpatient Medications:   •  Prenatal MV-Min-Fe Fum-FA-DHA (PRENATAL 1 PO), Take by mouth, Disp: , Rfl:     Allergies   Allergen Reactions   • Bee Venom Facial Swelling       Review of Systems  Constitutional: no fever, feels well  Breasts: no complaints of breast pain, breast lump, or nipple discharge  Gastrointestinal: no complaints nausea, vomiting  Genitourinary: as noted in HPI    Neurological: no complaints of headache      Objective      /66 (BP Location: Right arm, Patient Position: Sitting, Cuff Size: Standard)   Ht 5' 5" (1 651 m)   Wt 93 kg (205 lb)   LMP 12/13/2021 (Exact Date)   Breastfeeding Yes Comment: pumping  BMI 34 11 kg/m² OBGyn Exam  General: alert and oriented, in no acute distress

## 2023-02-08 ENCOUNTER — ANNUAL EXAM (OUTPATIENT)
Dept: OBGYN CLINIC | Facility: CLINIC | Age: 28
End: 2023-02-08

## 2023-02-08 VITALS
SYSTOLIC BLOOD PRESSURE: 112 MMHG | WEIGHT: 216 LBS | BODY MASS INDEX: 35.99 KG/M2 | HEIGHT: 65 IN | DIASTOLIC BLOOD PRESSURE: 74 MMHG

## 2023-02-08 DIAGNOSIS — Z01.419 ENCOUNTER FOR WELL WOMAN EXAM WITH ROUTINE GYNECOLOGICAL EXAM: Primary | ICD-10-CM

## 2023-02-08 DIAGNOSIS — N93.8 DYSFUNCTIONAL UTERINE BLEEDING: ICD-10-CM

## 2023-02-08 NOTE — PROGRESS NOTES
ASSESSMENT & PLAN:   Diagnoses and all orders for this visit:    Encounter for well woman exam with routine gynecological exam    Dysfunctional uterine bleeding  -     CBC and Platelet; Future    Discussed prolonged menstrual bleeding occurring for the last 2 months  Discussed options including starting oral contraceptive to help regulate her menstrual cycle and her bleeding  Cycle length is appropriate occurring every 28 days however her bleeding is prolonged  Patient elected to monitor her next menstrual cycle for any improvement of her menstrual bleeding length  Will reach out if her menses is prolonged once again, and oral contraceptives can be sent into the pharmacy  Reviewed risks and benefits of utilization of OCPs including increased risk for stroke and VTE, breast cancer, and pregnancy prevention efficacy  Patient is planning to attempt conception again at the end of the year for second baby  The following were reviewed in today's visit: ASCCP guidelines, Gardisil vaccination, STD testing breast self exam, family planning choices, exercise and healthy diet  Patient to return to office in yearly for annual exam      All questions have been answered to her satisfaction  CC:  Annual Gynecologic Examination  Chief Complaint   Patient presents with   • Gynecologic Exam     Pt here today for her annual exam  11/03/21 neg pap/hpv       HPI: Elijah Candelario is a 32 y o  Greg Root who presents for annual gynecologic examination  She has the following concerns: Klever Salguero is doing well however she reports that since she stopped pumping for breastmilk her menses have been prolonged  Her initial menses after delivery lasted for approximately 3 weeks  Her next menses occurred after 28 days as anticipated however her menstrual flow lasted for approximately 6 weeks  She reports that the bleeding was less than her normal period however was prolonged        Health Maintenance:    Exercise: frequently  Breast exams/breast awareness: yes  Diet: well balanced diet      Past Medical History:   Diagnosis Date   • Anxiety        Past Surgical History:   Procedure Laterality Date   • WISDOM TOOTH EXTRACTION         Past OB/Gyn History:  Period Pattern: (!) Irregular  Menstrual Flow: Moderate, Light  Menstrual Control: Maxi pad TamponPatient's last menstrual period was 01/01/2023  Last Pap: 2021 : no abnormalities  History of abnormal Pap smear: no  HPV vaccine completed: yes    Patient is currently sexually active     STD testing: no  Current contraception: condoms      Family History  Family History   Problem Relation Age of Onset   • Hypertension Mother    • Hypothyroidism Mother    • No Known Problems Father    • Colon cancer Maternal Grandfather    • Colon cancer Paternal Grandmother    • Colon cancer Family    • Coronary artery disease Family    • Hypertension Family    • Hypothyroidism Family        Family history of uterine or ovarian cancer: no  Family history of breast cancer: no  Family history of colon cancer: no    Social History:  Social History     Socioeconomic History   • Marital status: /Civil Union     Spouse name: Not on file   • Number of children: Not on file   • Years of education: Not on file   • Highest education level: Not on file   Occupational History   • Not on file   Tobacco Use   • Smoking status: Never   • Smokeless tobacco: Never   Vaping Use   • Vaping Use: Never used   Substance and Sexual Activity   • Alcohol use: Yes     Comment: socially pre pregnancy   • Drug use: No   • Sexual activity: Yes     Partners: Male     Birth control/protection: None, Coitus interruptus   Other Topics Concern   • Not on file   Social History Narrative   • Not on file     Social Determinants of Health     Financial Resource Strain: Not on file   Food Insecurity: Not on file   Transportation Needs: Not on file   Physical Activity: Not on file   Stress: Not on file   Social Connections: Not on file   Intimate Partner Violence: Not on file   Housing Stability: Not on file     Domestic violence screen: negative    Allergies: Allergies   Allergen Reactions   • Bee Venom Facial Swelling       Medications:  No current outpatient medications on file  Review of Systems:  Review of Systems   Constitutional: Negative for activity change, appetite change and unexpected weight change  Respiratory: Negative for cough and shortness of breath  Cardiovascular: Negative for chest pain  Gastrointestinal: Negative for abdominal pain, constipation, diarrhea, nausea and vomiting  Genitourinary: Positive for menstrual problem (prolonged menses)  Negative for difficulty urinating, dyspareunia, frequency, pelvic pain, urgency, vaginal bleeding, vaginal discharge and vaginal pain  Musculoskeletal: Negative for back pain  Skin: Negative  Neurological: Negative for dizziness, weakness, light-headedness and headaches  Psychiatric/Behavioral: Negative  Physical Exam:  /74 (BP Location: Right arm, Patient Position: Sitting, Cuff Size: Standard)   Ht 5' 5" (1 651 m)   Wt 98 kg (216 lb)   LMP 01/01/2023   Breastfeeding No   BMI 35 94 kg/m²    Physical Exam  Constitutional:       General: She is not in acute distress  Appearance: Normal appearance  She is well-developed and overweight  She is not diaphoretic  Genitourinary:      Vulva and bladder normal       No lesions in the vagina  Genitourinary Comments: Perineum normal in appearance, no lacerations, no ulcerations, no lesions visualized  Right Labia: No rash, tenderness or lesions  Left Labia: No tenderness, lesions or rash  No inguinal adenopathy present in the right or left side  No vaginal discharge, erythema, tenderness or bleeding  No vaginal prolapse present  No vaginal atrophy present  Right Adnexa: not tender, not full and no mass present       Left Adnexa: not tender, not full and no mass present  Cervix is nulliparous  No cervical motion tenderness, discharge, friability, lesion or polyp  No parametrium nodularity or thickening present  Uterus is not enlarged, tender or prolapsed  No uterine mass detected  Uterus is midaxial       No urethral prolapse or mass present  Bladder is not tender  Pelvic exam was performed with patient in the lithotomy position  Rectum:      No tenderness or external hemorrhoid  Breasts:     Breasts are symmetrical       Right: No swelling, bleeding, mass, skin change or tenderness  Left: No swelling, bleeding, mass, skin change or tenderness  HENT:      Head: Normocephalic and atraumatic  Neck:      Thyroid: No thyromegaly or thyroid tenderness  Cardiovascular:      Rate and Rhythm: Normal rate and regular rhythm  Heart sounds: Normal heart sounds  No murmur heard  No friction rub  Pulmonary:      Effort: Pulmonary effort is normal  No respiratory distress  Breath sounds: Normal breath sounds  No wheezing or rales  Abdominal:      Palpations: Abdomen is soft  There is no mass  Tenderness: There is no abdominal tenderness  There is no guarding  Musculoskeletal:         General: No tenderness  Normal range of motion  Right lower leg: No edema  Left lower leg: No edema  Lymphadenopathy:      Lower Body: No right inguinal adenopathy  No left inguinal adenopathy  Neurological:      Mental Status: She is alert and oriented to person, place, and time  Skin:     General: Skin is warm and dry  Coloration: Skin is not pale  Findings: No erythema  Psychiatric:         Mood and Affect: Mood normal          Behavior: Behavior normal          Thought Content: Thought content normal          Judgment: Judgment normal    Vitals and nursing note reviewed

## 2023-02-20 ENCOUNTER — PATIENT MESSAGE (OUTPATIENT)
Dept: OBGYN CLINIC | Facility: CLINIC | Age: 28
End: 2023-02-20

## 2023-02-20 ENCOUNTER — TELEPHONE (OUTPATIENT)
Dept: OBGYN CLINIC | Facility: CLINIC | Age: 28
End: 2023-02-20

## 2023-02-20 DIAGNOSIS — N93.8 DYSFUNCTIONAL UTERINE BLEEDING: Primary | ICD-10-CM

## 2023-02-20 NOTE — TELEPHONE ENCOUNTER
Patient called, she is 5 months PP  She was here on 2/8 to discuss heavy prolonged bleeding for the last 2 months  Patient is calling today stating she is heavily bleeding  She saturated 1 tampon in 10 minutes  She states she is sitting on the toilet because she is continuously bleeding  Discussed that she needs to be seen in the ED  Patient agreeable to plan

## 2023-02-21 ENCOUNTER — OFFICE VISIT (OUTPATIENT)
Dept: OBGYN CLINIC | Facility: CLINIC | Age: 28
End: 2023-02-21

## 2023-02-21 ENCOUNTER — APPOINTMENT (OUTPATIENT)
Dept: LAB | Facility: CLINIC | Age: 28
End: 2023-02-21

## 2023-02-21 VITALS
DIASTOLIC BLOOD PRESSURE: 80 MMHG | SYSTOLIC BLOOD PRESSURE: 106 MMHG | WEIGHT: 216.6 LBS | HEIGHT: 65 IN | BODY MASS INDEX: 36.09 KG/M2

## 2023-02-21 DIAGNOSIS — R79.89 ELEVATED LIVER FUNCTION TESTS: ICD-10-CM

## 2023-02-21 DIAGNOSIS — N93.8 DYSFUNCTIONAL UTERINE BLEEDING: ICD-10-CM

## 2023-02-21 DIAGNOSIS — N92.1 MENORRHAGIA WITH IRREGULAR CYCLE: Primary | ICD-10-CM

## 2023-02-21 DIAGNOSIS — Z86.39 HISTORY OF VITAMIN D DEFICIENCY: ICD-10-CM

## 2023-02-21 DIAGNOSIS — R53.83 OTHER FATIGUE: ICD-10-CM

## 2023-02-21 LAB
ERYTHROCYTE [DISTWIDTH] IN BLOOD BY AUTOMATED COUNT: 15.9 % (ref 11.6–15.1)
HCT VFR BLD AUTO: 30.6 % (ref 34.8–46.1)
HGB BLD-MCNC: 9.3 G/DL (ref 11.5–15.4)
MCH RBC QN AUTO: 24.2 PG (ref 26.8–34.3)
MCHC RBC AUTO-ENTMCNC: 30.4 G/DL (ref 31.4–37.4)
MCV RBC AUTO: 80 FL (ref 82–98)
PLATELET # BLD AUTO: 228 THOUSANDS/UL (ref 149–390)
PMV BLD AUTO: 12.6 FL (ref 8.9–12.7)
RBC # BLD AUTO: 3.84 MILLION/UL (ref 3.81–5.12)
WBC # BLD AUTO: 7.41 THOUSAND/UL (ref 4.31–10.16)

## 2023-02-21 RX ORDER — NORETHINDRONE ACETATE AND ETHINYL ESTRADIOL 1MG-20(21)
1 KIT ORAL DAILY
Qty: 84 TABLET | Refills: 1 | Status: SHIPPED | OUTPATIENT
Start: 2023-02-21 | End: 2023-08-08

## 2023-02-21 NOTE — PROGRESS NOTES
Assessment/Plan:  - Awaiting CBC and US results  - TSH/iron/VitD and CMP ordered  - Recommend start COCP, can take continuously for 3 months, then monitor cycles  - Will reach out to patient with results  - Patient to go to ER if severe pain, syncope, saturating pad per hour       Diagnoses and all orders for this visit:    Menorrhagia with irregular cycle  -     TSH, 3rd generation with Free T4 reflex; Future  -     Iron Panel (Includes Ferritin, Iron Sat%, Iron, and TIBC); Future    Other fatigue  -     Vitamin D 25 hydroxy; Future    History of vitamin D deficiency  -     Vitamin D 25 hydroxy; Future    Elevated liver function tests  -     Comprehensive metabolic panel; Future          Subjective:      Patient ID: Carla Self is a 32 y o  female  Radu Mcbride is a 25YO  WF presenting to the office at 5 months postpartum with complaints of irregular and heavy vaginal bleeding  Patient reports this bleeding has been intermittent over the last few months  Yesterday, patient states she was bleeding through and around a super tampon in 10 minutes  It has slowed down today  She has a pelvic ultrasound scheduled for Friday  She has a script for OCPs that she plans to start  Patient completed a CBC at the lab today  She reports generally feeling off recently and fatigued due to the persistent heavy bleeding  The following portions of the patient's history were reviewed and updated as appropriate: allergies, current medications, past family history, past medical history, past social history, past surgical history and problem list     OB History        1    Para   1    Term   1       0    AB   0    Living   1       SAB   0    IAB   0    Ectopic   0    Multiple   0    Live Births   1           Obstetric Comments   Menarche 10                Review of Systems   Constitutional: Positive for fatigue  Negative for chills, fever and unexpected weight change     Respiratory: Negative for shortness of breath  Cardiovascular: Negative for chest pain  Gastrointestinal: Negative for abdominal pain  Genitourinary: Positive for vaginal bleeding  Skin: Negative for rash  Objective:      /80 (BP Location: Right arm, Patient Position: Sitting, Cuff Size: Standard)   Ht 5' 5" (1 651 m)   Wt 98 2 kg (216 lb 9 6 oz)   LMP 02/20/2023   Breastfeeding No   BMI 36 04 kg/m²          Physical Exam  Vitals reviewed  Constitutional:       Appearance: Normal appearance  HENT:      Head: Normocephalic and atraumatic  Pulmonary:      Effort: Pulmonary effort is normal    Abdominal:      General: There is no distension  Palpations: Abdomen is soft  Tenderness: There is no abdominal tenderness  Genitourinary:     General: Normal vulva  Exam position: Lithotomy position  Pubic Area: No rash  Labia:         Right: No rash or lesion  Left: No rash or lesion  Vagina: Bleeding (moderate blood noted ) present  No vaginal discharge, tenderness or lesions  Cervix: No discharge or lesion  Uterus: Normal  Not tender  Adnexa: Right adnexa normal and left adnexa normal         Right: No mass or tenderness  Left: No mass or tenderness  Skin:     General: Skin is warm and dry  Findings: No lesion or rash  Neurological:      General: No focal deficit present  Mental Status: She is alert

## 2023-02-23 ENCOUNTER — PATIENT MESSAGE (OUTPATIENT)
Dept: OBGYN CLINIC | Facility: CLINIC | Age: 28
End: 2023-02-23

## 2023-02-23 ENCOUNTER — APPOINTMENT (OUTPATIENT)
Dept: LAB | Facility: CLINIC | Age: 28
End: 2023-02-23

## 2023-02-23 DIAGNOSIS — R79.89 ELEVATED LIVER FUNCTION TESTS: ICD-10-CM

## 2023-02-23 DIAGNOSIS — N92.1 MENORRHAGIA WITH IRREGULAR CYCLE: ICD-10-CM

## 2023-02-23 DIAGNOSIS — D50.9 IRON DEFICIENCY ANEMIA, UNSPECIFIED IRON DEFICIENCY ANEMIA TYPE: Primary | ICD-10-CM

## 2023-02-23 DIAGNOSIS — R53.83 OTHER FATIGUE: ICD-10-CM

## 2023-02-23 DIAGNOSIS — Z86.39 HISTORY OF VITAMIN D DEFICIENCY: ICD-10-CM

## 2023-02-23 LAB
25(OH)D3 SERPL-MCNC: 17.9 NG/ML (ref 30–100)
ALBUMIN SERPL BCP-MCNC: 3.9 G/DL (ref 3.5–5)
ALP SERPL-CCNC: 84 U/L (ref 46–116)
ALT SERPL W P-5'-P-CCNC: 44 U/L (ref 12–78)
ANION GAP SERPL CALCULATED.3IONS-SCNC: 6 MMOL/L (ref 4–13)
AST SERPL W P-5'-P-CCNC: 18 U/L (ref 5–45)
BILIRUB SERPL-MCNC: 0.28 MG/DL (ref 0.2–1)
BUN SERPL-MCNC: 16 MG/DL (ref 5–25)
CALCIUM SERPL-MCNC: 9.2 MG/DL (ref 8.3–10.1)
CHLORIDE SERPL-SCNC: 108 MMOL/L (ref 96–108)
CO2 SERPL-SCNC: 24 MMOL/L (ref 21–32)
CREAT SERPL-MCNC: 0.68 MG/DL (ref 0.6–1.3)
FERRITIN SERPL-MCNC: 8 NG/ML (ref 8–388)
GFR SERPL CREATININE-BSD FRML MDRD: 118 ML/MIN/1.73SQ M
GLUCOSE P FAST SERPL-MCNC: 70 MG/DL (ref 65–99)
IRON SATN MFR SERPL: 5 % (ref 15–50)
IRON SERPL-MCNC: 18 UG/DL (ref 50–170)
POTASSIUM SERPL-SCNC: 4.1 MMOL/L (ref 3.5–5.3)
PROT SERPL-MCNC: 7.6 G/DL (ref 6.4–8.4)
SODIUM SERPL-SCNC: 138 MMOL/L (ref 135–147)
TIBC SERPL-MCNC: 333 UG/DL (ref 250–450)
TSH SERPL DL<=0.05 MIU/L-ACNC: 1.66 UIU/ML (ref 0.45–4.5)

## 2023-02-24 ENCOUNTER — HOSPITAL ENCOUNTER (OUTPATIENT)
Dept: ULTRASOUND IMAGING | Facility: HOSPITAL | Age: 28
Discharge: HOME | End: 2023-02-24
Attending: OBSTETRICS & GYNECOLOGY

## 2023-02-24 DIAGNOSIS — N93.8 DYSFUNCTIONAL UTERINE BLEEDING: ICD-10-CM

## 2023-02-27 PROBLEM — D50.9 IRON DEFICIENCY ANEMIA: Status: ACTIVE | Noted: 2023-02-27

## 2023-02-27 RX ORDER — SODIUM CHLORIDE 9 MG/ML
20 INJECTION, SOLUTION INTRAVENOUS ONCE
Status: CANCELLED | OUTPATIENT
Start: 2023-02-28

## 2023-02-27 NOTE — TELEPHONE ENCOUNTER
Therapy plan has been ordered for iron infusions at [facility]  Set them up for twice weekly but if she can't do that often, she can switch the schedule to weekly infusions

## 2023-02-28 ENCOUNTER — TELEPHONE (OUTPATIENT)
Dept: OBGYN CLINIC | Facility: CLINIC | Age: 28
End: 2023-02-28

## 2023-02-28 RX ORDER — SODIUM CHLORIDE 9 MG/ML
20 INJECTION, SOLUTION INTRAVENOUS ONCE
Status: CANCELLED | OUTPATIENT
Start: 2023-03-07

## 2023-02-28 NOTE — TELEPHONE ENCOUNTER
Received message from [facility] pre-encounter Dept  That pt needs prior auth for upcoming Venofer (iron) infusions      Venofer , 200mg twice weekly   @ [facility]   1st appt 03/07/23   Ordered by Dr Nancy Rubio prior auth form and clinicals to fax # [phone]    PA CURRENTLY PENDING REVIEW

## 2023-03-07 ENCOUNTER — HOSPITAL ENCOUNTER (OUTPATIENT)
Dept: INFUSION CENTER | Facility: HOSPITAL | Age: 28
Discharge: HOME | End: 2023-03-07
Attending: OBSTETRICS & GYNECOLOGY

## 2023-03-07 VITALS
HEART RATE: 92 BPM | TEMPERATURE: 97.6 F | SYSTOLIC BLOOD PRESSURE: 117 MMHG | DIASTOLIC BLOOD PRESSURE: 71 MMHG | RESPIRATION RATE: 16 BRPM | OXYGEN SATURATION: 100 %

## 2023-03-07 DIAGNOSIS — D50.9 IRON DEFICIENCY ANEMIA, UNSPECIFIED IRON DEFICIENCY ANEMIA TYPE: Primary | ICD-10-CM

## 2023-03-07 RX ORDER — SODIUM CHLORIDE 9 MG/ML
20 INJECTION, SOLUTION INTRAVENOUS ONCE
Status: COMPLETED | OUTPATIENT
Start: 2023-03-07 | End: 2023-03-07

## 2023-03-07 RX ORDER — SODIUM CHLORIDE 9 MG/ML
20 INJECTION, SOLUTION INTRAVENOUS ONCE
Status: CANCELLED | OUTPATIENT
Start: 2023-03-09

## 2023-03-07 RX ADMIN — IRON SUCROSE 200 MG: 20 INJECTION, SOLUTION INTRAVENOUS at 14:05

## 2023-03-07 RX ADMIN — SODIUM CHLORIDE 20 ML/HR: 9 INJECTION, SOLUTION INTRAVENOUS at 14:05

## 2023-03-09 ENCOUNTER — HOSPITAL ENCOUNTER (OUTPATIENT)
Dept: INFUSION CENTER | Facility: HOSPITAL | Age: 28
Discharge: HOME | End: 2023-03-09
Attending: OBSTETRICS & GYNECOLOGY

## 2023-03-09 VITALS
DIASTOLIC BLOOD PRESSURE: 83 MMHG | TEMPERATURE: 97.6 F | SYSTOLIC BLOOD PRESSURE: 128 MMHG | RESPIRATION RATE: 16 BRPM | HEART RATE: 98 BPM

## 2023-03-09 DIAGNOSIS — D50.9 IRON DEFICIENCY ANEMIA, UNSPECIFIED IRON DEFICIENCY ANEMIA TYPE: Primary | ICD-10-CM

## 2023-03-09 RX ORDER — SODIUM CHLORIDE 9 MG/ML
20 INJECTION, SOLUTION INTRAVENOUS ONCE
Status: COMPLETED | OUTPATIENT
Start: 2023-03-09 | End: 2023-03-09

## 2023-03-09 RX ORDER — SODIUM CHLORIDE 9 MG/ML
20 INJECTION, SOLUTION INTRAVENOUS ONCE
Status: CANCELLED | OUTPATIENT
Start: 2023-03-14

## 2023-03-09 RX ADMIN — SODIUM CHLORIDE 20 ML/HR: 9 INJECTION, SOLUTION INTRAVENOUS at 13:11

## 2023-03-09 RX ADMIN — IRON SUCROSE 200 MG: 20 INJECTION, SOLUTION INTRAVENOUS at 13:12

## 2023-03-14 ENCOUNTER — HOSPITAL ENCOUNTER (OUTPATIENT)
Dept: INFUSION CENTER | Facility: HOSPITAL | Age: 28
Discharge: HOME | End: 2023-03-14
Attending: OBSTETRICS & GYNECOLOGY

## 2023-03-14 VITALS
SYSTOLIC BLOOD PRESSURE: 130 MMHG | DIASTOLIC BLOOD PRESSURE: 83 MMHG | TEMPERATURE: 98.9 F | HEART RATE: 80 BPM | RESPIRATION RATE: 16 BRPM | OXYGEN SATURATION: 98 %

## 2023-03-14 DIAGNOSIS — D50.9 IRON DEFICIENCY ANEMIA, UNSPECIFIED IRON DEFICIENCY ANEMIA TYPE: Primary | ICD-10-CM

## 2023-03-14 RX ORDER — SODIUM CHLORIDE 9 MG/ML
20 INJECTION, SOLUTION INTRAVENOUS ONCE
Status: CANCELLED | OUTPATIENT
Start: 2023-03-16

## 2023-03-14 RX ORDER — SODIUM CHLORIDE 9 MG/ML
20 INJECTION, SOLUTION INTRAVENOUS ONCE
Status: COMPLETED | OUTPATIENT
Start: 2023-03-14 | End: 2023-03-14

## 2023-03-14 RX ADMIN — SODIUM CHLORIDE 20 ML/HR: 9 INJECTION, SOLUTION INTRAVENOUS at 13:31

## 2023-03-14 RX ADMIN — IRON SUCROSE 200 MG: 20 INJECTION, SOLUTION INTRAVENOUS at 13:31

## 2023-03-16 ENCOUNTER — HOSPITAL ENCOUNTER (OUTPATIENT)
Dept: INFUSION CENTER | Facility: HOSPITAL | Age: 28
Discharge: HOME | End: 2023-03-16
Attending: OBSTETRICS & GYNECOLOGY

## 2023-03-16 VITALS
RESPIRATION RATE: 16 BRPM | HEART RATE: 86 BPM | DIASTOLIC BLOOD PRESSURE: 74 MMHG | SYSTOLIC BLOOD PRESSURE: 120 MMHG | TEMPERATURE: 98.7 F | OXYGEN SATURATION: 100 %

## 2023-03-16 DIAGNOSIS — D50.9 IRON DEFICIENCY ANEMIA, UNSPECIFIED IRON DEFICIENCY ANEMIA TYPE: Primary | ICD-10-CM

## 2023-03-16 RX ORDER — SODIUM CHLORIDE 9 MG/ML
20 INJECTION, SOLUTION INTRAVENOUS ONCE
Status: COMPLETED | OUTPATIENT
Start: 2023-03-16 | End: 2023-03-16

## 2023-03-16 RX ORDER — SODIUM CHLORIDE 9 MG/ML
20 INJECTION, SOLUTION INTRAVENOUS ONCE
Status: CANCELLED | OUTPATIENT
Start: 2023-03-21

## 2023-03-16 RX ADMIN — SODIUM CHLORIDE 20 ML/HR: 9 INJECTION, SOLUTION INTRAVENOUS at 09:54

## 2023-03-16 RX ADMIN — IRON SUCROSE 200 MG: 20 INJECTION, SOLUTION INTRAVENOUS at 09:57

## 2023-03-16 NOTE — PROGRESS NOTES
Pt here for venofer infusion tolerated well no adverse reactions declined AVS and left ambulatory with steady gait

## 2023-03-21 ENCOUNTER — HOSPITAL ENCOUNTER (OUTPATIENT)
Dept: INFUSION CENTER | Facility: HOSPITAL | Age: 28
Discharge: HOME | End: 2023-03-21
Attending: OBSTETRICS & GYNECOLOGY

## 2023-03-21 VITALS
HEART RATE: 74 BPM | SYSTOLIC BLOOD PRESSURE: 136 MMHG | OXYGEN SATURATION: 100 % | DIASTOLIC BLOOD PRESSURE: 77 MMHG | TEMPERATURE: 99.2 F | RESPIRATION RATE: 16 BRPM

## 2023-03-21 DIAGNOSIS — D50.9 IRON DEFICIENCY ANEMIA, UNSPECIFIED IRON DEFICIENCY ANEMIA TYPE: Primary | ICD-10-CM

## 2023-03-21 RX ORDER — SODIUM CHLORIDE 9 MG/ML
20 INJECTION, SOLUTION INTRAVENOUS ONCE
Status: COMPLETED | OUTPATIENT
Start: 2023-03-21 | End: 2023-03-21

## 2023-03-21 RX ORDER — SODIUM CHLORIDE 9 MG/ML
20 INJECTION, SOLUTION INTRAVENOUS ONCE
Status: CANCELLED | OUTPATIENT
Start: 2023-03-23

## 2023-03-21 RX ADMIN — SODIUM CHLORIDE 20 ML/HR: 9 INJECTION, SOLUTION INTRAVENOUS at 14:42

## 2023-03-21 RX ADMIN — IRON SUCROSE 200 MG: 20 INJECTION, SOLUTION INTRAVENOUS at 14:47

## 2023-03-21 NOTE — PROGRESS NOTES
Pt arrived amb for Venofer infusion, cynthia well  PIV removed intact  Pressure dssg applied  Disch amb to home, steady gait

## 2023-03-28 ENCOUNTER — HOSPITAL ENCOUNTER (OUTPATIENT)
Dept: INFUSION CENTER | Facility: HOSPITAL | Age: 28
Discharge: HOME | End: 2023-03-28
Attending: OBSTETRICS & GYNECOLOGY

## 2023-03-28 VITALS
SYSTOLIC BLOOD PRESSURE: 125 MMHG | OXYGEN SATURATION: 100 % | HEART RATE: 83 BPM | RESPIRATION RATE: 18 BRPM | DIASTOLIC BLOOD PRESSURE: 72 MMHG | TEMPERATURE: 98.9 F

## 2023-03-28 DIAGNOSIS — D50.9 IRON DEFICIENCY ANEMIA, UNSPECIFIED IRON DEFICIENCY ANEMIA TYPE: Primary | ICD-10-CM

## 2023-03-28 RX ORDER — SODIUM CHLORIDE 9 MG/ML
20 INJECTION, SOLUTION INTRAVENOUS ONCE
Status: CANCELLED | OUTPATIENT
Start: 2023-03-30

## 2023-03-28 RX ORDER — SODIUM CHLORIDE 9 MG/ML
20 INJECTION, SOLUTION INTRAVENOUS ONCE
Status: COMPLETED | OUTPATIENT
Start: 2023-03-28 | End: 2023-03-28

## 2023-03-28 RX ADMIN — IRON SUCROSE 200 MG: 20 INJECTION, SOLUTION INTRAVENOUS at 13:19

## 2023-03-28 RX ADMIN — SODIUM CHLORIDE 20 ML/HR: 9 INJECTION, SOLUTION INTRAVENOUS at 13:19

## 2023-03-30 ENCOUNTER — HOSPITAL ENCOUNTER (OUTPATIENT)
Dept: INFUSION CENTER | Facility: HOSPITAL | Age: 28
Discharge: HOME | End: 2023-03-30
Attending: OBSTETRICS & GYNECOLOGY

## 2023-03-30 VITALS
TEMPERATURE: 97.7 F | HEART RATE: 74 BPM | DIASTOLIC BLOOD PRESSURE: 79 MMHG | OXYGEN SATURATION: 99 % | RESPIRATION RATE: 16 BRPM | SYSTOLIC BLOOD PRESSURE: 126 MMHG

## 2023-03-30 DIAGNOSIS — D50.9 IRON DEFICIENCY ANEMIA, UNSPECIFIED IRON DEFICIENCY ANEMIA TYPE: Primary | ICD-10-CM

## 2023-03-30 RX ORDER — SODIUM CHLORIDE 9 MG/ML
20 INJECTION, SOLUTION INTRAVENOUS ONCE
Status: COMPLETED | OUTPATIENT
Start: 2023-03-30 | End: 2023-03-30

## 2023-03-30 RX ORDER — SODIUM CHLORIDE 9 MG/ML
20 INJECTION, SOLUTION INTRAVENOUS ONCE
Status: CANCELLED | OUTPATIENT
Start: 2023-04-04

## 2023-03-30 RX ADMIN — IRON SUCROSE 200 MG: 20 INJECTION, SOLUTION INTRAVENOUS at 12:24

## 2023-03-30 RX ADMIN — SODIUM CHLORIDE 20 ML/HR: 9 INJECTION, SOLUTION INTRAVENOUS at 12:24

## 2023-03-30 NOTE — PROGRESS NOTES
Pt reported a 2 cm x 3 cm itchy raised pink area on her inner wrist distal to the IV site; Venofer stopped; started NS; notified Dr Neto Hooper who stated ok to stop infusion today and cancel her last appt; pt ok with this plan; rash subsided; pt made aware to contact physician with any further issues; pt verb understanding; left unit amb with steady gait

## 2023-06-15 DIAGNOSIS — N93.8 DYSFUNCTIONAL UTERINE BLEEDING: ICD-10-CM

## 2023-06-16 RX ORDER — NORETHINDRONE ACETATE AND ETHINYL ESTRADIOL 1MG-20(21)
1 KIT ORAL DAILY
Qty: 84 TABLET | Refills: 0 | Status: SHIPPED | OUTPATIENT
Start: 2023-06-16 | End: 2023-12-01

## 2023-06-30 ENCOUNTER — APPOINTMENT (OUTPATIENT)
Dept: URGENT CARE | Facility: CLINIC | Age: 28
End: 2023-06-30

## 2023-06-30 DIAGNOSIS — Z02.1 PRE-EMPLOYMENT HEALTH SCREENING EXAMINATION: Primary | ICD-10-CM

## 2023-06-30 LAB
MEV IGG SER QL IA: NORMAL
MUV IGG SER QL IA: NORMAL
RUBV IGG SERPL IA-ACNC: 94.1 IU/ML
VZV IGG SER QL IA: NORMAL

## 2023-06-30 PROCEDURE — 86765 RUBEOLA ANTIBODY: CPT

## 2023-06-30 PROCEDURE — 86480 TB TEST CELL IMMUN MEASURE: CPT

## 2023-06-30 PROCEDURE — 86735 MUMPS ANTIBODY: CPT

## 2023-06-30 PROCEDURE — 86787 VARICELLA-ZOSTER ANTIBODY: CPT

## 2023-06-30 PROCEDURE — 86762 RUBELLA ANTIBODY: CPT

## 2023-07-03 LAB
GAMMA INTERFERON BACKGROUND BLD IA-ACNC: 0.05 IU/ML
M TB IFN-G BLD-IMP: NEGATIVE
M TB IFN-G CD4+ BCKGRND COR BLD-ACNC: -0.01 IU/ML
M TB IFN-G CD4+ BCKGRND COR BLD-ACNC: 0 IU/ML
MITOGEN IGNF BCKGRD COR BLD-ACNC: >10 IU/ML

## 2023-08-29 ENCOUNTER — OFFICE VISIT (OUTPATIENT)
Dept: FAMILY MEDICINE CLINIC | Facility: OTHER | Age: 28
End: 2023-08-29
Payer: COMMERCIAL

## 2023-08-29 VITALS
DIASTOLIC BLOOD PRESSURE: 60 MMHG | TEMPERATURE: 98.2 F | SYSTOLIC BLOOD PRESSURE: 128 MMHG | OXYGEN SATURATION: 98 % | HEIGHT: 65 IN | RESPIRATION RATE: 15 BRPM | BODY MASS INDEX: 37.49 KG/M2 | HEART RATE: 88 BPM | WEIGHT: 225 LBS

## 2023-08-29 DIAGNOSIS — Z00.00 ANNUAL PHYSICAL EXAM: Primary | ICD-10-CM

## 2023-08-29 DIAGNOSIS — D50.9 IRON DEFICIENCY ANEMIA, UNSPECIFIED IRON DEFICIENCY ANEMIA TYPE: ICD-10-CM

## 2023-08-29 DIAGNOSIS — R22.1 LOCALIZED SWELLING, MASS OR LUMP OF NECK: ICD-10-CM

## 2023-08-29 PROCEDURE — 99395 PREV VISIT EST AGE 18-39: CPT

## 2023-08-29 NOTE — PATIENT INSTRUCTIONS
Wellness Visit for Adults   AMBULATORY CARE:   A wellness visit  is when you see your healthcare provider to get screened for health problems. Your healthcare provider will also give you advice on how to stay healthy. Write down your questions so you remember to ask them. Ask your healthcare provider how often you should have a wellness visit. What happens at a wellness visit:  Your healthcare provider will ask about your health, and your family history of health problems. This includes high blood pressure, heart disease, and cancer. He or she will ask if you have symptoms that concern you, if you smoke, and about your mood. You may also be asked about your intake of medicines, supplements, food, and alcohol. Any of the following may be done:  • Your weight  will be checked. Your height may also be checked so your body mass index (BMI) can be calculated. Your BMI shows if you are at a healthy weight. • Your blood pressure  and heart rate will be checked. Your temperature may also be checked. • Blood and urine tests  may be done. Blood tests may be done to check your cholesterol levels. Abnormal cholesterol levels increase your risk for heart disease and stroke. You may also need a blood or urine test to check for diabetes if you are at increased risk. Urine tests may be done to look for signs of an infection or kidney disease. • A physical exam  includes checking your heartbeat and lungs with a stethoscope. Your healthcare provider may also check your skin to look for sun damage. • Screening tests  may be recommended. A screening test is done to check for diseases that may not cause symptoms. The screening tests you may need depend on your age, gender, family history, and lifestyle habits. For example, colorectal screening may be recommended if you are 48years old or older. Screening tests you need if you are a woman:   • A Pap smear  is used to screen for cervical cancer.  Pap smears are usually done every 3 to 5 years depending on your age. You may need them more often if you have had abnormal Pap smear test results in the past. Ask your healthcare provider how often you should have a Pap smear. • A mammogram  is an x-ray of your breasts to screen for breast cancer. Experts recommend mammograms every 2 years starting at age 48 years. You may need a mammogram at age 52 years or younger if you have an increased risk for breast cancer. Talk to your healthcare provider about when you should start having mammograms and how often you need them. Vaccines you may need:   • Get an influenza vaccine  every year. The influenza vaccine protects you from the flu. Several types of viruses cause the flu. The viruses change over time, so new vaccines are made each year. • Get a tetanus-diphtheria (Td) booster vaccine  every 10 years. This vaccine protects you against tetanus and diphtheria. Tetanus is a severe infection that may cause painful muscle spasms and lockjaw. Diphtheria is a severe bacterial infection that causes a thick covering in the back of your mouth and throat. • Get a human papillomavirus (HPV) vaccine  if you are female and aged 23 to 32 or male 23 to 24 and never received it. This vaccine protects you from HPV infection. HPV is the most common infection spread by sexual contact. HPV may also cause vaginal, penile, and anal cancers. • Get a pneumococcal vaccine  if you are aged 72 years or older. The pneumococcal vaccine is an injection given to protect you from pneumococcal disease. Pneumococcal disease is an infection caused by pneumococcal bacteria. The infection may cause pneumonia, meningitis, or an ear infection. • Get a shingles vaccine  if you are 60 or older, even if you have had shingles before. The shingles vaccine is an injection to protect you from the varicella-zoster virus. This is the same virus that causes chickenpox.  Shingles is a painful rash that develops in people who had chickenpox or have been exposed to the virus. How to eat healthy:  My Plate is a model for planning healthy meals. It shows the types and amounts of foods that should go on your plate. Fruits and vegetables make up about half of your plate, and grains and protein make up the other half. A serving of dairy is included on the side of your plate. The amount of calories and serving sizes you need depends on your age, gender, weight, and height. Examples of healthy foods are listed below:  • Eat a variety of vegetables  such as dark green, red, and orange vegetables. You can also include canned vegetables low in sodium (salt) and frozen vegetables without added butter or sauces. • Eat a variety of fresh fruits , canned fruit in 100% juice, frozen fruit, and dried fruit. • Include whole grains. At least half of the grains you eat should be whole grains. Examples include whole-wheat bread, wheat pasta, brown rice, and whole-grain cereals such as oatmeal.    • Eat a variety of protein foods such as seafood (fish and shellfish), lean meat, and poultry without skin (turkey and chicken). Examples of lean meats include pork leg, shoulder, or tenderloin, and beef round, sirloin, tenderloin, and extra lean ground beef. Other protein foods include eggs and egg substitutes, beans, peas, soy products, nuts, and seeds. • Choose low-fat dairy products such as skim or 1% milk or low-fat yogurt, cheese, and cottage cheese. • Limit unhealthy fats  such as butter, hard margarine, and shortening. Exercise:  Exercise at least 30 minutes per day on most days of the week. Some examples of exercise include walking, biking, dancing, and swimming. You can also fit in more physical activity by taking the stairs instead of the elevator or parking farther away from stores. Include muscle strengthening activities 2 days each week. Regular exercise provides many health benefits.  It helps you manage your weight, and decreases your risk for type 2 diabetes, heart disease, stroke, and high blood pressure. Exercise can also help improve your mood. Ask your healthcare provider about the best exercise plan for you. General health and safety guidelines:   • Do not smoke. Nicotine and other chemicals in cigarettes and cigars can cause lung damage. Ask your healthcare provider for information if you currently smoke and need help to quit. E-cigarettes or smokeless tobacco still contain nicotine. Talk to your healthcare provider before you use these products. • Limit alcohol. A drink of alcohol is 12 ounces of beer, 5 ounces of wine, or 1½ ounces of liquor. • Lose weight, if needed. Being overweight increases your risk of certain health conditions. These include heart disease, high blood pressure, type 2 diabetes, and certain types of cancer. • Protect your skin. Do not sunbathe or use tanning beds. Use sunscreen with a SPF 15 or higher. Apply sunscreen at least 15 minutes before you go outside. Reapply sunscreen every 2 hours. Wear protective clothing, hats, and sunglasses when you are outside. • Drive safely. Always wear your seatbelt. Make sure everyone in your car wears a seatbelt. A seatbelt can save your life if you are in an accident. Do not use your cell phone when you are driving. This could distract you and cause an accident. Pull over if you need to make a call or send a text message. • Practice safe sex. Use latex condoms if are sexually active and have more than one partner. Your healthcare provider may recommend screening tests for sexually transmitted infections (STIs). • Wear helmets, lifejackets, and protective gear. Always wear a helmet when you ride a bike or motorcycle, go skiing, or play sports that could cause a head injury. Wear protective equipment when you play sports. Wear a lifejacket when you are on a boat or doing water sports.     © Copyright [employer] 2022 Information is for End User's use only and may not be sold, redistributed or otherwise used for commercial purposes. The above information is an  only. It is not intended as medical advice for individual conditions or treatments. Talk to your doctor, nurse or pharmacist before following any medical regimen to see if it is safe and effective for you. Wellness Visit for Adults   AMBULATORY CARE:   A wellness visit  is when you see your healthcare provider to get screened for health problems. Your healthcare provider will also give you advice on how to stay healthy. Write down your questions so you remember to ask them. Ask your healthcare provider how often you should have a wellness visit. What happens at a wellness visit:  Your healthcare provider will ask about your health, and your family history of health problems. This includes high blood pressure, heart disease, and cancer. He or she will ask if you have symptoms that concern you, if you smoke, and about your mood. You may also be asked about your intake of medicines, supplements, food, and alcohol. Any of the following may be done:  • Your weight  will be checked. Your height may also be checked so your body mass index (BMI) can be calculated. Your BMI shows if you are at a healthy weight. • Your blood pressure  and heart rate will be checked. Your temperature may also be checked. • Blood and urine tests  may be done. Blood tests may be done to check your cholesterol levels. Abnormal cholesterol levels increase your risk for heart disease and stroke. You may also need a blood or urine test to check for diabetes if you are at increased risk. Urine tests may be done to look for signs of an infection or kidney disease. • A physical exam  includes checking your heartbeat and lungs with a stethoscope. Your healthcare provider may also check your skin to look for sun damage. • Screening tests  may be recommended.  A screening test is done to check for diseases that may not cause symptoms. The screening tests you may need depend on your age, gender, family history, and lifestyle habits. For example, colorectal screening may be recommended if you are 48years old or older. Screening tests you need if you are a woman:   • A Pap smear  is used to screen for cervical cancer. Pap smears are usually done every 3 to 5 years depending on your age. You may need them more often if you have had abnormal Pap smear test results in the past. Ask your healthcare provider how often you should have a Pap smear. • A mammogram  is an x-ray of your breasts to screen for breast cancer. Experts recommend mammograms every 2 years starting at age 48 years. You may need a mammogram at age 52 years or younger if you have an increased risk for breast cancer. Talk to your healthcare provider about when you should start having mammograms and how often you need them. Vaccines you may need:   • Get an influenza vaccine  every year. The influenza vaccine protects you from the flu. Several types of viruses cause the flu. The viruses change over time, so new vaccines are made each year. • Get a tetanus-diphtheria (Td) booster vaccine  every 10 years. This vaccine protects you against tetanus and diphtheria. Tetanus is a severe infection that may cause painful muscle spasms and lockjaw. Diphtheria is a severe bacterial infection that causes a thick covering in the back of your mouth and throat. • Get a human papillomavirus (HPV) vaccine  if you are female and aged 23 to 32 or male 23 to 24 and never received it. This vaccine protects you from HPV infection. HPV is the most common infection spread by sexual contact. HPV may also cause vaginal, penile, and anal cancers. • Get a pneumococcal vaccine  if you are aged 72 years or older. The pneumococcal vaccine is an injection given to protect you from pneumococcal disease.  Pneumococcal disease is an infection caused by pneumococcal bacteria. The infection may cause pneumonia, meningitis, or an ear infection. • Get a shingles vaccine  if you are 60 or older, even if you have had shingles before. The shingles vaccine is an injection to protect you from the varicella-zoster virus. This is the same virus that causes chickenpox. Shingles is a painful rash that develops in people who had chickenpox or have been exposed to the virus. How to eat healthy:  My Plate is a model for planning healthy meals. It shows the types and amounts of foods that should go on your plate. Fruits and vegetables make up about half of your plate, and grains and protein make up the other half. A serving of dairy is included on the side of your plate. The amount of calories and serving sizes you need depends on your age, gender, weight, and height. Examples of healthy foods are listed below:  • Eat a variety of vegetables  such as dark green, red, and orange vegetables. You can also include canned vegetables low in sodium (salt) and frozen vegetables without added butter or sauces. • Eat a variety of fresh fruits , canned fruit in 100% juice, frozen fruit, and dried fruit. • Include whole grains. At least half of the grains you eat should be whole grains. Examples include whole-wheat bread, wheat pasta, brown rice, and whole-grain cereals such as oatmeal.    • Eat a variety of protein foods such as seafood (fish and shellfish), lean meat, and poultry without skin (turkey and chicken). Examples of lean meats include pork leg, shoulder, or tenderloin, and beef round, sirloin, tenderloin, and extra lean ground beef. Other protein foods include eggs and egg substitutes, beans, peas, soy products, nuts, and seeds. • Choose low-fat dairy products such as skim or 1% milk or low-fat yogurt, cheese, and cottage cheese. • Limit unhealthy fats  such as butter, hard margarine, and shortening.        Exercise:  Exercise at least 30 minutes per day on most days of the week. Some examples of exercise include walking, biking, dancing, and swimming. You can also fit in more physical activity by taking the stairs instead of the elevator or parking farther away from stores. Include muscle strengthening activities 2 days each week. Regular exercise provides many health benefits. It helps you manage your weight, and decreases your risk for type 2 diabetes, heart disease, stroke, and high blood pressure. Exercise can also help improve your mood. Ask your healthcare provider about the best exercise plan for you. General health and safety guidelines:   • Do not smoke. Nicotine and other chemicals in cigarettes and cigars can cause lung damage. Ask your healthcare provider for information if you currently smoke and need help to quit. E-cigarettes or smokeless tobacco still contain nicotine. Talk to your healthcare provider before you use these products. • Limit alcohol. A drink of alcohol is 12 ounces of beer, 5 ounces of wine, or 1½ ounces of liquor. • Lose weight, if needed. Being overweight increases your risk of certain health conditions. These include heart disease, high blood pressure, type 2 diabetes, and certain types of cancer. • Protect your skin. Do not sunbathe or use tanning beds. Use sunscreen with a SPF 15 or higher. Apply sunscreen at least 15 minutes before you go outside. Reapply sunscreen every 2 hours. Wear protective clothing, hats, and sunglasses when you are outside. • Drive safely. Always wear your seatbelt. Make sure everyone in your car wears a seatbelt. A seatbelt can save your life if you are in an accident. Do not use your cell phone when you are driving. This could distract you and cause an accident. Pull over if you need to make a call or send a text message. • Practice safe sex. Use latex condoms if are sexually active and have more than one partner.  Your healthcare provider may recommend screening tests for sexually transmitted infections (STIs). • Wear helmets, lifejackets, and protective gear. Always wear a helmet when you ride a bike or motorcycle, go skiing, or play sports that could cause a head injury. Wear protective equipment when you play sports. Wear a lifejacket when you are on a boat or doing water sports. © Copyright [facility] 2022 Information is for End User's use only and may not be sold, redistributed or otherwise used for commercial purposes. The above information is an  only. It is not intended as medical advice for individual conditions or treatments. Talk to your doctor, nurse or pharmacist before following any medical regimen to see if it is safe and effective for you.

## 2023-08-29 NOTE — PROGRESS NOTES
[address] [facility]    NAME: Surinder Collins  AGE: 25 y.o. SEX: female  : 1995     DATE: 2023     Assessment and Plan:   Pt is a 25 YOF with PMH of PB who presents today for AWV. Pt endorses no specific complaints or concerns at this time and states she is feeling overall well. Recent laboratory studied from this February WNL save for PB, with treatment noted as below. Orders and labs as below, return in one year for next well or sooner if needed for problem visit. Problem List Items Addressed This Visit        Other    Iron deficiency anemia     Pt diagnosed with PB in antepartum period 2/2 menorrhagia this February, treated with several months of IV venofer and OCPs with resolution of symptoms. Pt has discontinued OCPs and venofer, states she follows with OB for mgmt and is currently asymptomatic          Localized swelling, mass or lump of neck     Pt endorsed small, mobile, soft mass of neck at R claviculocervial junction consistent with small cystic structure. Referral placed to dermatology for further assessment          Relevant Orders    Ambulatory Referral to Dermatology   Other Visit Diagnoses     Annual physical exam    -  Primary          Immunizations and preventive care screenings were discussed with patient today. Appropriate education was printed on patient's after visit summary. Counseling:  Alcohol/drug use: discussed moderation in alcohol intake, the recommendations for healthy alcohol use, and avoidance of illicit drug use. Dental Health: discussed importance of regular tooth brushing, flossing, and dental visits. Injury prevention: discussed safety/seat belts, safety helmets, smoke detectors, carbon dioxide detectors, and smoking near bedding or upholstery.   Sexual health: discussed sexually transmitted diseases, partner selection, use of condoms, avoidance of unintended pregnancy, and contraceptive alternatives. · Exercise: the importance of regular exercise/physical activity was discussed. Recommend exercise 3-5 times per week for at least 30 minutes. BMI Counseling: Body mass index is 37.44 kg/m². The BMI is above normal. Nutrition recommendations include encouraging healthy choices of fruits and vegetables and limiting drinks that contain sugar. Exercise recommendations include moderate physical activity 150 minutes/week. No pharmacotherapy was ordered. Rationale for BMI follow-up plan is due to patient being overweight or obese. Depression Screening and Follow-up Plan: Patient was screened for depression during today's encounter. They screened negative with a PHQ-2 score of 0. Return in about 1 year (around 8/29/2024) for Annual physical.     Chief Complaint:     Chief Complaint   Patient presents with   • Physical Exam     Caring starts with you - labwork      History of Present Illness:     Adult Annual Physical   Patient here for a comprehensive physical exam. The patient reports no problems. Diet and Physical Activity  · Diet/Nutrition: well balanced diet, frequent junk food and consuming 3-5 servings of fruits/vegetables daily. · Exercise: 3-4 times a week on average and 30-60 minutes on average. Depression Screening  PHQ-2/9 Depression Screening    Little interest or pleasure in doing things: 0 - not at all  Feeling down, depressed, or hopeless: 0 - not at all  PHQ-2 Score: 0  PHQ-2 Interpretation: Negative depression screen       General Health  · Sleep: sleeps well and gets 7-8 hours of sleep on average. · Hearing: normal - bilateral.  · Vision: no vision problems. · Dental: regular dental visits, brushes teeth twice daily and flosses teeth occasionally. /GYN Health  · Last menstrual period: 8/14/2023  · Contraceptive method: natural family planning.   · History of STDs?: no.     Review of Systems:     Review of Systems   Constitutional: Negative for chills and fever. HENT: Negative for ear pain and sore throat. Eyes: Negative for pain and visual disturbance. Respiratory: Negative for cough and shortness of breath. Cardiovascular: Negative for chest pain and palpitations. Gastrointestinal: Negative for abdominal pain and vomiting. Genitourinary: Negative for dysuria and hematuria. Musculoskeletal: Negative for arthralgias and back pain. Skin: Negative for color change and rash. Neurological: Negative for seizures and syncope. All other systems reviewed and are negative.      Past Medical History:     Past Medical History:   Diagnosis Date   • Anemia    • Anxiety       Past Surgical History:     Past Surgical History:   Procedure Laterality Date   • WISDOM TOOTH EXTRACTION        Social History:     Social History     Socioeconomic History   • Marital status: /Civil Union     Spouse name: None   • Number of children: None   • Years of education: None   • Highest education level: None   Occupational History   • None   Tobacco Use   • Smoking status: Never   • Smokeless tobacco: Never   Vaping Use   • Vaping Use: Never used   Substance and Sexual Activity   • Alcohol use: Not Currently     Comment: socially pre pregnancy   • Drug use: No   • Sexual activity: Yes     Partners: Male     Birth control/protection: Coitus interruptus, OCP   Other Topics Concern   • None   Social History Narrative   • None     Social Determinants of Health     Financial Resource Strain: Not on file   Food Insecurity: Not on file   Transportation Needs: Not on file   Physical Activity: Not on file   Stress: Not on file   Social Connections: Not on file   Intimate Partner Violence: Not on file   Housing Stability: Not on file      Family History:     Family History   Problem Relation Age of Onset   • Hypertension Mother    • Hypothyroidism Mother    • Thyroid disease Mother    • No Known Problems Father    • Colon cancer Maternal Grandfather    • Colon cancer Paternal Grandmother    • Colon cancer Family    • Coronary artery disease Family    • Hypertension Family    • Hypothyroidism Family       Current Medications:     No current outpatient medications on file. No current facility-administered medications for this visit. Allergies: Allergies   Allergen Reactions   • Bee Venom Facial Swelling      Physical Exam:     /60   Pulse 88   Temp 98.2 °F (36.8 °C)   Resp 15   Ht 5' 5" (1.651 m)   Wt 102 kg (225 lb)   SpO2 98%   BMI 37.44 kg/m²     Physical Exam  Vitals and nursing note reviewed. Constitutional:       General: She is not in acute distress. Appearance: She is well-developed. HENT:      Head: Normocephalic and atraumatic. Eyes:      Conjunctiva/sclera: Conjunctivae normal.   Cardiovascular:      Rate and Rhythm: Normal rate and regular rhythm. Heart sounds: No murmur heard. Pulmonary:      Effort: Pulmonary effort is normal. No respiratory distress. Breath sounds: Normal breath sounds. Abdominal:      Palpations: Abdomen is soft. Tenderness: There is no abdominal tenderness. Musculoskeletal:         General: No swelling. Cervical back: Neck supple. Skin:     General: Skin is warm and dry. Capillary Refill: Capillary refill takes less than 2 seconds. Neurological:      Mental Status: She is alert.    Psychiatric:         Mood and Affect: Mood normal.          Darcie Kayser, MD   [address] [place] [address]

## 2023-08-30 PROBLEM — R22.1 LOCALIZED SWELLING, MASS OR LUMP OF NECK: Status: ACTIVE | Noted: 2023-08-30

## 2023-08-30 NOTE — ASSESSMENT & PLAN NOTE
Pt diagnosed with PB in antepartum period 2/2 menorrhagia this February, treated with several months of IV venofer and OCPs with resolution of symptoms.  Pt has discontinued OCPs and venofer, states she follows with OB for mgmt and is currently asymptomatic

## 2023-08-30 NOTE — ASSESSMENT & PLAN NOTE
Pt endorsed small, mobile, soft mass of neck at R claviculocervial junction consistent with small cystic structure.  Referral placed to dermatology for further assessment

## 2023-09-15 ENCOUNTER — APPOINTMENT (OUTPATIENT)
Dept: LAB | Facility: CLINIC | Age: 28
End: 2023-09-15

## 2023-09-15 DIAGNOSIS — Z00.8 HEALTH EXAMINATION IN POPULATION SURVEY: ICD-10-CM

## 2023-09-15 LAB
CHOLEST SERPL-MCNC: 164 MG/DL
EST. AVERAGE GLUCOSE BLD GHB EST-MCNC: 100 MG/DL
HBA1C MFR BLD: 5.1 %
HDLC SERPL-MCNC: 49 MG/DL
LDLC SERPL CALC-MCNC: 104 MG/DL (ref 0–100)
NONHDLC SERPL-MCNC: 115 MG/DL
TRIGL SERPL-MCNC: 53 MG/DL

## 2023-09-15 PROCEDURE — 36415 COLL VENOUS BLD VENIPUNCTURE: CPT

## 2023-09-15 PROCEDURE — 80061 LIPID PANEL: CPT

## 2023-09-15 PROCEDURE — 83036 HEMOGLOBIN GLYCOSYLATED A1C: CPT

## 2023-12-20 ENCOUNTER — OFFICE VISIT (OUTPATIENT)
Dept: DERMATOLOGY | Facility: CLINIC | Age: 28
End: 2023-12-20

## 2023-12-20 VITALS — BODY MASS INDEX: 36.65 KG/M2 | TEMPERATURE: 98.5 F | WEIGHT: 220 LBS | HEIGHT: 65 IN

## 2023-12-20 DIAGNOSIS — R22.1 LOCALIZED SWELLING, MASS OR LUMP OF NECK: ICD-10-CM

## 2023-12-20 DIAGNOSIS — L72.0 EPIDERMAL CYST OF NECK: Primary | ICD-10-CM

## 2023-12-20 NOTE — PROGRESS NOTES
"[facility] Clinic Note     Patient Name: Francheska Cardoza  Encounter Date: 12/20/2023     Have you been cared for by a [facility] Dermatologist in the last 3 years and, if so, which description applies to you?    NO.   I am considered a \"new\" patient and must complete all patient intake questions. I am FEMALE/of child-bearing potential.    REVIEW OF SYSTEMS:  Have you recently had or currently have any of the following? Recent fever or chills? No  Any non-healing wound? No  Are you pregnant or planning to become pregnant? YES, planing to become  Are you currently or planning to be nursing or breast feeding? No   PAST MEDICAL HISTORY:  Have you personally ever had or currently have any of the following?  If \"YES,\" then please provide more detail. Skin cancer (such as Melanoma, Basal Cell Carcinoma, Squamous Cell Carcinoma?  No  Tuberculosis, HIV/AIDS, Hepatitis B or C: No  Radiation Treatment No   HISTORY OF IMMUNOSUPPRESSION:   Do you have a history of any of the following:  Systemic Immunosuppression such as Diabetes, Biologic or Immunotherapy, Chemotherapy, Organ Transplantation, Bone Marrow Transplantation?  No    Answering \"YES\" requires the addition of the dotphrase \"IMMUNOSUPPRESSED\" as the first diagnosis of the patient's visit.   FAMILY HISTORY:  Any \"first degree relatives\" (parent, brother, sister, or child) with the following?    Skin Cancer, Pancreatic or Other Cancer? YES, dad had basal cell   PATIENT EXPERIENCE:    Do you want the Dermatologist to perform a COMPLETE skin exam today including a clinical examination under the \"bra and underwear\" areas?  NO  If necessary, do we have your permission to call and leave a detailed message on your Preferred Phone number that includes your specific medical information?  Yes      Allergies   Allergen Reactions    Bee Venom Facial Swelling      Current Outpatient Medications:     Prenatal Vit-Fe Fumarate-FA (PRENATAL FA PO), Take by mouth daily, " Disp: , Rfl:           Whom besides the patient is providing clinical information about today's encounter?   NO ADDITIONAL HISTORIAN (patient alone provided history)    Physical Exam and Assessment/Plan by Diagnosis:    EPIDERMAL  CYST    Physical Exam:  Anatomic Location Affected:  right anterior neck  Morphological Description:  skin colored subcutaneous nodule       Additional History of Present Condition:  Patient present for cyst on neck     Assessment and Plan:  Based on a thorough discussion of this condition and the management approach to it (including a comprehensive discussion of the known risks, side effects and potential benefits of treatment), the patient (family) agrees to implement the following specific plan:  Punch excision done today    PROCEDURE:  EXCISION WITH SIMPLE  CLOSURE    Attending:   Assistant: Jennifer    Pre-Op Diagnosis: epidermal cyst  Post-Op Diagnosis: Same    Lesion Anatomic Location: right anterior neck   Pre-op size: 0.5 centimeter  Size of defect:  0.5 cm  (with 0 centimeter margins)  Final repaired wound length:  0.5 cm    Written and verbal, witnessed informed consent was obtained. I discussed that excision is a method of removing lesions both benign and malignant lesions.  A portion of normal skin is often taken to ensure completeness of removal.  I reviewed that procedure will include numbing the area,  cutting around and under defect, undermining tissue, and closing the wound with external sutures.   These sutures are usually removed in  14 days. Risks (bleeding, pain, infection, scarring, recurrence) and benefits discussed. It was discussed with patient that every effort is made to minimize scar, but scarring is influenced also by extrinsic factor such as location, age and genetics.     Time Out: performed:  yes  Correct patient: yes.   Correct site per Clinic Report: yes  Correct site per Patient Report: yes    LOCAL ANESTHESIA: 1% xylocaine with epi     DESCRIPTION  OF PROCEDURE: The patient was brought back into the procedure room, anesthetized locally, prepped and draped in the usual fashion. Using a #15 blade with a scalpel, the lesion was excised in elliptical fashion. Hemostasis was achieved with light electrocoagulation. Purpose of undermining was to decrease wound tension and facilitate closure.      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    The patient tolerated the procedure well without any complications. The wound was cleaned with sterile saline, dried off, surgical vaseline ointment was applied, and the wound was covered. A pressure dressing was applied for stabilization and light pressure. The patient was given detailed oral and written instructions on postoperative care as detailed in consent. The patient left in good medical condition.      POSTOP DISCUSSION DISCUSSION AND INSTRUCTIONS FOR PATIENT      Rationale for Procedure  A skin excision allows the dermatologist to remove a lesion. The procedure involves a local numbing medication and removing the entire lesion. Typically, the lesion is being removed because it is atypical, traumatized, or for significant appearance reasons.  The area will be open like a brush burn and allowed to heal.   There will be no sutures.Tissue is sent to pathologist who will reconfirm diagnosis and verify completeness of lesion removal.    Description of Procedure  We would like to perform a skin excision today.  A local anesthetic, similar to the kind that a dentist uses when filling a cavity, will be injected with a very small needle into the skin area to be sampled.  The injected skin and tissue underneath should “go to sleep” and become numbed so that no further pain should be felt.  A scalpel will be used to cut around the lesion  and tissue will be submitted to pathology for examination.  Depending on the diagnosis the lesion will be excised with a certain amount of normal skin to help assure completeness of lesion removal.  The physician will discuss in advance the anticipated size and extent of removal.   Bleeding will occur, but it will stopped with direct pressure, sutures, and electrocautery.    Surgical “Vaseline-type” ointment will also applied after the procedure to help create a barrier between the wound and the outside world.      Risks and Potential Complications  The advantage of a skin excision is that it allows us to remove a problem lesion quickly.  Although this usually permits the lesion to heal as soon as possible with the least scarring, there are some risks and potential complications that include but are not limited to the following:  Some bleeding is normal at time of procedure and some bleeding on gauze is normal  the first few days after surgery.  Profuse bleeding and bleeding with swelling and pain should be reported as detailed  below  Infection is uncommon in skin surgery.  Infection should be reported and is indicated by pain, redness, and discharge of purulent material.  Some dull to at time sharp pain could occur initially the day after surgery.  Persistent pain or increasing pain days after surgery is not expected and should be reported.  Every effort is made to minimize scar, but location, size, and genetics do play a role in scar appearance.  A surgical wound does not achieve its optimal appearance until 6 months.  There are several treatments available if scarring would be problematic including scar creams, silicone pad, laser and scar revision.  Skin discoloration can occur especially in people of color.  Its important to avoid sun on wound in first 6 months after surgery.  Treatment is available if pigment is problematic.  Incomplete removal of the lesion or recurrence of lesion can occur and this would  then require further treatment and more surgery.  Nerve Damage/Numbness/Loss of Function is very rare, but is most likely to occur if lesion is large or if it is in a high risk location  Allergic Reaction to lidocaine is rare.  More commonly,  epinephrine is used  with the lidocaine.  Occasionally, epinephrine (aka adrenalin) may cause a brief  feeling of anxiety or jitteriness.  The person at the microscope  (pathologist) may provide additional information that was unexpected. This unexpected finding could provoke the need for additional treatment or evaluation.    What You Will Need to Do After the Procedure  Keep the area clean and dry the first day. Try NOT to remove the bandage for the first day.  Gently clean the area with soap and water and apply Vaseline ointment (this is over the counter and not a prescription) to the excision  site for up to 2 weeks.    Apply a clean appropriately sized bandage to area.  Gauze and paper tape are recommended for sensitive skin.  Return for suture removal as instructed (generally 1 week for the face, 2 weeks for the body).  Take Acetaminophen (Tylenol) for discomfort, if no contraindications.  Do NOT take Ibuprofen or aspirin unless specifically told to do so by your Dermatologist because these medications can make bleeding worse.  Call our office immediately for signs of infection: fever, chills, increased redness, warmth, tenderness, discomfort/pain, or pus or foul smell coming from the wound.    If bleeding is noticed, place a clean cloth over the area and apply firm pressure for thirty minutes.  Check the wound ONLY after 30 minutes of direct pressure; do not cheat and sneak a peak, as that does not count.  If bleeding persists after 30 minutes of legitimate direct pressure, then try one more round of direct pressure for an additional 10 minutes to the area.  Should the bleeding become heavier or not stop after the second attempt, call [facility] directly at  [phone] (SKIN) or, if after hours, go to your local Emergency Room/Emergency Department.          Scribe Attestation      I,:  Jennifer Tomas am acting as a scribe while in the presence of the attending physician.:       I,:  Del Mata MD personally performed the services described in this documentation    as scribed in my presence.:

## 2023-12-20 NOTE — PATIENT INSTRUCTIONS
EPIDERMAL  CYST  POSTOP DISCUSSION DISCUSSION AND INSTRUCTIONS FOR PATIENT      Rationale for Procedure  A skin excision allows the dermatologist to remove a lesion. The procedure involves a local numbing medication and removing the entire lesion. Typically, the lesion is being removed because it is atypical, traumatized, or for significant appearance reasons.  The area will be open like a brush burn and allowed to heal.   There will be no sutures.Tissue is sent to pathologist who will reconfirm diagnosis and verify completeness of lesion removal.    Description of Procedure  We would like to perform a skin excision today.  A local anesthetic, similar to the kind that a dentist uses when filling a cavity, will be injected with a very small needle into the skin area to be sampled.  The injected skin and tissue underneath should “go to sleep” and become numbed so that no further pain should be felt.  A scalpel will be used to cut around the lesion and tissue will be submitted to pathology for examination.  Depending on the diagnosis the lesion will be excised with a certain amount of normal skin to help assure completeness of lesion removal.  The physician will discuss in advance the anticipated size and extent of removal.   Bleeding will occur, but it will stopped with direct pressure, sutures, and electrocautery.    Surgical “Vaseline-type” ointment will also applied after the procedure to help create a barrier between the wound and the outside world.      Risks and Potential Complications  The advantage of a skin excision is that it allows us to remove a problem lesion quickly.  Although this usually permits the lesion to heal as soon as possible with the least scarring, there are some risks and potential complications that include but are not limited to the following:  Some bleeding is normal at time of procedure and some bleeding on gauze is normal  the first few days after surgery.  Profuse bleeding and bleeding  with swelling and pain should be reported as detailed  below  Infection is uncommon in skin surgery.  Infection should be reported and is indicated by pain, redness, and discharge of purulent material.  Some dull to at time sharp pain could occur initially the day after surgery.  Persistent pain or increasing pain days after surgery is not expected and should be reported.  Every effort is made to minimize scar, but location, size, and genetics do play a role in scar appearance.  A surgical wound does not achieve its optimal appearance until 6 months.  There are several treatments available if scarring would be problematic including scar creams, silicone pad, laser and scar revision.  Skin discoloration can occur especially in people of color.  Its important to avoid sun on wound in first 6 months after surgery.  Treatment is available if pigment is problematic.  Incomplete removal of the lesion or recurrence of lesion can occur and this would then require further treatment and more surgery.  Nerve Damage/Numbness/Loss of Function is very rare, but is most likely to occur if lesion is large or if it is in a high risk location  Allergic Reaction to lidocaine is rare.  More commonly,  epinephrine is used  with the lidocaine.  Occasionally, epinephrine (aka adrenalin) may cause a brief  feeling of anxiety or jitteriness.  The person at the microscope  (pathologist) may provide additional information that was unexpected. This unexpected finding could provoke the need for additional treatment or evaluation.    What You Will Need to Do After the Procedure  Keep the area clean and dry the first day. Try NOT to remove the bandage for the first day.  Gently clean the area with soap and water and apply Vaseline ointment (this is over the counter and not a prescription) to the excision  site for up to 2 weeks.    Apply a clean appropriately sized bandage to area.  Gauze and paper tape are recommended for sensitive skin.  Return  for suture removal as instructed (generally 1 week for the face, 2 weeks for the body).  Take Acetaminophen (Tylenol) for discomfort, if no contraindications.  Do NOT take Ibuprofen or aspirin unless specifically told to do so by your Dermatologist because these medications can make bleeding worse.  Call our office immediately for signs of infection: fever, chills, increased redness, warmth, tenderness, discomfort/pain, or pus or foul smell coming from the wound.    If bleeding is noticed, place a clean cloth over the area and apply firm pressure for thirty minutes.  Check the wound ONLY after 30 minutes of direct pressure; do not cheat and sneak a peak, as that does not count.  If bleeding persists after 30 minutes of legitimate direct pressure, then try one more round of direct pressure for an additional 10 minutes to the area.  Should the bleeding become heavier or not stop after the second attempt, call [facility] directly at [phone] (SKIN) or, if after hours, go to your local Emergency Room/Emergency Department.          LASER-HAIR REDUCTION INSTRUCTIONS    Prep-Care (what to do before your appointment)    Area must be shaved 24 hours prior to your appointment.  The closer the shave the better.   For bikini services, don’t shave the part where you want to keep the hair.  No makeup/lotion/deodorant on the day of your appointment (on treatment area).  Stay out of direct sunlight for at least 3 days prior to your appointment (& 3 days after).  Do not use self-gilbert or spray tan products for at least 2 weeks before your treatment to avoid potential injury.  Avoid drinking more than 2 alcoholic beverages 24 hours before your treatment .  Avoid waxing/threading/tweezing in the area for at least 4 weeks. Shaving is ok!    Post-Care    Redness & Bumps are normal.  Immediately after your treatment, redness & bumps at the treatment area are common; these may last up to 2 hours or longer. It is normal  for the treated area to feel like a sunburn for a few hours. You should use a cold compress if the sensitivity continues. If there is any crusting, apply an antibiotic cream. Darker pigmented skin may have more discomfort than lighter skin & may persist longer.  Cleanse the area treated gently.  The treated area may be washed gently with a mild soap. Skin should be patted dry & not rubbed during the first 48 hours.  No makeup & lotion/moisturizer/deodorant for the first 24 hours.  Keep the treated area clean & dry, if further redness or irritation persists, skip your makeup & moisturizer, & deodorant (for underarms) until the irritation has subsided.  Dead hairs will begin to shed 5-30 days after your treatment.  Stubble, representing dead hair being shed from the hair follicle, will appear within 5-30 days from the treatment date. that is normal & they will fall out quickly.  Exfoliate to speed up hair shedding.  Anywhere from 5-30 days after the treatment, shedding of the hair may occur & this may appear as new hair growth. It is not new hair growth, but the dead hair pushing its way out of the follicle. You can help the hair come out by using light exfoliation with a washcloth  & shaving.  Avoid the sun.  Avoid sun exposure to reduce the chance of dark or light spots for 2 months. Use sunscreen (spf 30 or higher) at all times throughout the treatment period & for 1-2 months following.  Do not pick/scratch/wax/thread/tweeze the area.  Avoid picking or scratching the treated skin. do not use any other hair removal methods or products, other than shaving, on the treated area during the course of your laser treatments, as it will prevent you from achieving the best results.  Hair growth varies.  On average, most individuals will be pleased with hair reduction after around 6, up to 9 sessions

## 2024-01-08 ENCOUNTER — OFFICE VISIT (OUTPATIENT)
Dept: URGENT CARE | Facility: CLINIC | Age: 29
End: 2024-01-08
Payer: COMMERCIAL

## 2024-01-08 VITALS
TEMPERATURE: 98.4 F | RESPIRATION RATE: 18 BRPM | HEART RATE: 100 BPM | OXYGEN SATURATION: 99 % | SYSTOLIC BLOOD PRESSURE: 112 MMHG | DIASTOLIC BLOOD PRESSURE: 72 MMHG

## 2024-01-08 DIAGNOSIS — J02.9 ACUTE PHARYNGITIS, UNSPECIFIED ETIOLOGY: Primary | ICD-10-CM

## 2024-01-08 DIAGNOSIS — J02.9 SORE THROAT: ICD-10-CM

## 2024-01-08 LAB — S PYO AG THROAT QL: NEGATIVE

## 2024-01-08 PROCEDURE — 99213 OFFICE O/P EST LOW 20 MIN: CPT

## 2024-01-08 PROCEDURE — 87880 STREP A ASSAY W/OPTIC: CPT

## 2024-01-08 NOTE — PROGRESS NOTES
[facility]        NAME: Francheska Cardoza is a 28 y.o. female  : 1995    MRN: [id number]  DATE: 2024  TIME: 9:47 AM    Assessment and Plan   Acute pharyngitis, unspecified etiology [J02.9]  1. Acute pharyngitis, unspecified etiology        2. Sore throat  POCT rapid ANTIGEN strepA        - Declined COVID/Flu testing  - Declined throat culture     Patient Instructions       Follow up with PCP in 3-5 days.  Proceed to  ER if symptoms worsen.    Chief Complaint     Chief Complaint   Patient presents with    Sore Throat    Fever     Patient reports symptoms started yesterday wants to be tested.         History of Present Illness       27 y/o F presents for sore throat and fever x 2 days. Sick daughter at home with similar symptoms; which resolved spontaneously. Tylenol and IBU PRN. Last dose was 6 am. Home COVID test negative this AM.         Review of Systems   Review of Systems   Constitutional:  Positive for chills and fever.   HENT:  Positive for sore throat. Negative for congestion, ear pain and rhinorrhea.    Respiratory:  Negative for cough.          Current Medications       Current Outpatient Medications:     Prenatal Vit-Fe Fumarate-FA (PRENATAL FA PO), Take by mouth daily, Disp: , Rfl:     Current Allergies     Allergies as of 2024 - Reviewed 2024   Allergen Reaction Noted    Bee venom Facial Swelling 10/27/2017            The following portions of the patient's history were reviewed and updated as appropriate: allergies, current medications, past family history, past medical history, past social history, past surgical history and problem list.     Past Medical History:   Diagnosis Date    Anemia     Anxiety        Past Surgical History:   Procedure Laterality Date    WISDOM TOOTH EXTRACTION         Family History   Problem Relation Age of Onset    Hypertension Mother     Hypothyroidism Mother     Thyroid disease Mother     Basal cell carcinoma Father     Colon cancer  Maternal Grandfather     Colon cancer Paternal Grandmother     Colon cancer Family     Coronary artery disease Family     Hypertension Family     Hypothyroidism Family          Medications have been verified.        Objective   /72   Pulse 100   Temp 98.4 °F (36.9 °C) (Tympanic)   Resp 18   SpO2 99%   No LMP recorded.       Physical Exam     Physical Exam  Vitals and nursing note reviewed.   Constitutional:       General: She is not in acute distress.     Appearance: She is not toxic-appearing.   HENT:      Head: Normocephalic and atraumatic.      Right Ear: Tympanic membrane, ear canal and external ear normal.      Left Ear: Tympanic membrane, ear canal and external ear normal.      Nose: Nose normal.      Mouth/Throat:      Mouth: Mucous membranes are moist.      Pharynx: Posterior oropharyngeal erythema present.      Comments: Questional R sided tonsil stone vs. Exuadate   Eyes:      Conjunctiva/sclera: Conjunctivae normal.   Neurological:      Mental Status: She is alert.   Psychiatric:         Mood and Affect: Mood normal.         Behavior: Behavior normal.

## 2024-02-05 ENCOUNTER — TELEPHONE (OUTPATIENT)
Dept: OBGYN CLINIC | Facility: CLINIC | Age: 29
End: 2024-02-05

## 2024-02-05 ENCOUNTER — APPOINTMENT (OUTPATIENT)
Dept: LAB | Facility: HOSPITAL | Age: 29
End: 2024-02-05
Payer: COMMERCIAL

## 2024-02-05 DIAGNOSIS — O20.9 BLEEDING IN EARLY PREGNANCY: Primary | ICD-10-CM

## 2024-02-05 DIAGNOSIS — O20.9 BLEEDING IN EARLY PREGNANCY: ICD-10-CM

## 2024-02-05 LAB — B-HCG SERPL-ACNC: 20 MIU/ML (ref 0–5)

## 2024-02-05 PROCEDURE — 84702 CHORIONIC GONADOTROPIN TEST: CPT

## 2024-02-05 PROCEDURE — 36415 COLL VENOUS BLD VENIPUNCTURE: CPT

## 2024-02-05 NOTE — TELEPHONE ENCOUNTER
Patient called, she is early on in her pregnancy. LMP is 12/28 which would make her 5w4d. She has a confirmation US scheduled for 2/28. She started this AM with bleeding, she states it is heavy and similar to a regular period. She also is having menstrual cramps, she denies any severe pain. Do you want me to order HCG's level for her to get done?

## 2024-02-07 ENCOUNTER — APPOINTMENT (OUTPATIENT)
Dept: LAB | Facility: HOSPITAL | Age: 29
End: 2024-02-07
Payer: COMMERCIAL

## 2024-02-07 DIAGNOSIS — O20.9 BLEEDING IN EARLY PREGNANCY: ICD-10-CM

## 2024-02-07 LAB — B-HCG SERPL-ACNC: 7 MIU/ML (ref 0–5)

## 2024-02-07 PROCEDURE — 36415 COLL VENOUS BLD VENIPUNCTURE: CPT

## 2024-02-07 PROCEDURE — 84702 CHORIONIC GONADOTROPIN TEST: CPT

## 2024-05-06 ENCOUNTER — ULTRASOUND (OUTPATIENT)
Dept: OBGYN CLINIC | Facility: CLINIC | Age: 29
End: 2024-05-06
Payer: COMMERCIAL

## 2024-05-06 VITALS
HEIGHT: 65 IN | SYSTOLIC BLOOD PRESSURE: 130 MMHG | WEIGHT: 230.4 LBS | DIASTOLIC BLOOD PRESSURE: 76 MMHG | BODY MASS INDEX: 38.39 KG/M2

## 2024-05-06 DIAGNOSIS — N91.2 AMENORRHEA: Primary | ICD-10-CM

## 2024-05-06 PROCEDURE — 99214 OFFICE O/P EST MOD 30 MIN: CPT | Performed by: PHYSICIAN ASSISTANT

## 2024-05-06 PROCEDURE — 76817 TRANSVAGINAL US OBSTETRIC: CPT | Performed by: PHYSICIAN ASSISTANT

## 2024-05-06 NOTE — PROGRESS NOTES
"Assessment/Plan:  - Viable IUP @ 8w 6d EGA  - BINTA 24  - Continue PNV  - Patient to call for concerns  - RTO 3 weeks for OB intake    Encounter Diagnosis     ICD-10-CM    1. Amenorrhea  N91.2 Ambulatory Referral to Maternal Fetal Medicine     [facility] OB < 14 weeks single or first gestation level 1              Subjective:       Patient ID: Francheska Cardoza [id number]        Francheska Cardoza is a 29 y.o.  presenting to the office for pregnancy confirmation. Patient's last menstrual period was 2024. , placing her at 8w4d today with BINTA of 2024. She is feeling ok today.         OB History    Para Term  AB Living   3 1 1 0 1 1   SAB IAB Ectopic Multiple Live Births   1 0 0 0 1      # Outcome Date GA Lbr Refugio/2nd Weight Sex Delivery Anes PTL Lv   3 Current            2 Term 22 39w5d / 01:35 2930 g (6 lb 7.4 oz) F Vag-Spont EPI N JUAN   1 SAB               Obstetric Comments   Menarche 10          The following portions of the patient's history were reviewed and updated as appropriate: allergies, current medications, past family history, past medical history, past social history, past surgical history, and problem list.    Allergies:  Bee venom    Medications:    Current Outpatient Medications:     Prenatal Vit-Fe Fumarate-FA (PRENATAL FA PO), Take by mouth daily, Disp: , Rfl:       Review of Systems:   Review of Systems   Constitutional:  Positive for fatigue.   Gastrointestinal:  Positive for nausea. Negative for abdominal pain.   Genitourinary:  Negative for vaginal bleeding.   Psychiatric/Behavioral:  The patient is nervous/anxious.           Objective:       Visit Vitals  /76 (BP Location: Left arm, Patient Position: Sitting, Cuff Size: Standard)   Ht 5' 5\" (1.651 m)   Wt 105 kg (230 lb 6.4 oz)   LMP 2024   BMI 38.34 kg/m²   OB Status Pregnant   Smoking Status Never   BSA 2.1 m²        GEN: The patient was alert and oriented x3, pleasant well-appearing female in " no acute distress.   PULM: nonlabored respirations  MSK: Normal gait  : WNL  Skin: warm, dry  Neuro: no focal deficits  Psych: normal affect and judgement, cooperative    Ultrasound:     Viability US     Gestational sac: present               Location: intrauterine  Yolk sac: present  Fetal pole: present               CRL: 2.22 cm = 8w6d  Cardiac activity: present               Rate: 172 bpm     Ovaries: normal appearing bilaterally  Cul de sac: absence of free fluid  Uterus: normal in appearance           Ultrasound Probe Disinfection    A transvaginal ultrasound was performed.   Prior to use, disinfection was performed with High Level Disinfection Process (Trophon)  Probe serial number [id number]: [id number] was used    Janna Saunedrs PA-C  05/06/24  8:33 AM      I have spent a total time of 30 minutes on 05/06/24 in caring for this patient including Patient and family education, Documenting in the medical record, Reviewing / ordering tests, medicine, procedures  , and Obtaining or reviewing history  .

## 2024-05-08 ENCOUNTER — TELEPHONE (OUTPATIENT)
Dept: PERINATAL CARE | Facility: CLINIC | Age: 29
End: 2024-05-08

## 2024-05-08 NOTE — TELEPHONE ENCOUNTER
Left voicemail informing patient of the change for her upcoming Maternal Fetal Medicine appointment on 06/07 @ 2:30 pm we moved it a little later ,Requested she give our office a call back at [phone] with any questions or if she would need to reschedule.

## 2024-05-16 ENCOUNTER — INITIAL PRENATAL (OUTPATIENT)
Dept: OBGYN CLINIC | Facility: CLINIC | Age: 29
End: 2024-05-16

## 2024-05-16 VITALS
OXYGEN SATURATION: 98 % | HEART RATE: 99 BPM | HEIGHT: 66 IN | WEIGHT: 231.2 LBS | RESPIRATION RATE: 18 BRPM | BODY MASS INDEX: 37.16 KG/M2 | DIASTOLIC BLOOD PRESSURE: 68 MMHG | SYSTOLIC BLOOD PRESSURE: 124 MMHG

## 2024-05-16 DIAGNOSIS — Z3A.10 10 WEEKS GESTATION OF PREGNANCY: Primary | ICD-10-CM

## 2024-05-16 DIAGNOSIS — Z86.2 HISTORY OF ANEMIA: ICD-10-CM

## 2024-05-16 PROCEDURE — OBC

## 2024-05-16 NOTE — PROGRESS NOTES
OB INTAKE INTERVIEW  Patient is 29 y.o. who presents for OB intake at 10wks 0days  She is accompanied by Junito (her daughter) during this encounter  The father of her baby (Philip) is involved in the pregnancy.    Claudette is a pleasant . Her daughter was born with an incomplete cleft palate. She has a history of a SAB and anemia. She offered no concerns during today's intake. Reviewed her next appointment date, time, and what to expect.     Last Menstrual Period: 2024  Ultrasound: Measured 8 weeks 6 days on 2024  Estimated Date of Delivery: 2024 confirmed by US    PLAN-  -Prenatal labs ordered   -Added Iron Panel since h/o anemia  -Referral placed for MFM  -Reviewed Genetic Testing options   -CF/SMA previously completed  -Patient to contact the office with any concerns  -RTO for OB visit (pap indicated, last done 2021)    Signs/Symptoms of Pregnancy  Current pregnancy symptoms:   Nausea YES, advised Vitamin B6, Unisom  Vomiting NO  Breast tenderness YES  Fatigue YES  Constipation no  Headaches no  Cramping/spotting no  PICA cravings no    Diabetes-  Body mass index is 37.32 kg/m².  If patient has 1 or more, please order early 1 hour GTT  History of GDM no  BMI >35 YES  History of PCOS or current metformin use no  History of LGA/macrosomic infant (4000g/9lbs) no    If patient has 2 or more, please order early 1 hour GTT  BMI>30 YES  AMA no  First degree relative with type 2 diabetes no  History of chronic HTN, hyperlipidemia, elevated A1C no  High risk race (, , ,  or ) no    Hypertension- if you answer yes to any of the following, please order baseline preeclampsia labs (cbc, comprehensive metabolic panel, urine protein creatinine ratio, uric acid)  History of of chronic HTN no  History of gestational HTN no  History of preeclampsia, eclampsia, or HELLP syndrome no  History of diabetes no  History of lupus, autoimmune disease,  kidney disease no    Thyroid- if yes order TSH with reflex T4  History of thyroid disease no    Bleeding Disorder or Hx of DVT-patient or first degree relative with history of. Order the following if not done previously.   (Factor V, antithrombin III, prothrombin gene mutation, protein C and S Ag, lupus anticoagulant, anticardiolipin, beta-2 glycoprotein)   no    OB/GYN-  History of abnormal pap smear no       Date of last pap smear 2021  History of HPV no  History of Herpes/HSV no  History of other STI (gonorrhea, chlamydia, trich) no  History of prior  YES  History of prior  no  History of  delivery prior to 36 weeks 6 days no  History of blood transfusion no  Ok for blood transfusion YES    Substance screening-   History of tobacco use no  Currently using tobacco no  Substance Use Screen Level (N/A, LOW, HIGH) NO Risk    MRSA Screening-   Does the pt have a hx of MRSA? no    Immunizations:  Discussed Tdap vaccine YES  Discussed COVID Vaccine YES    Genetic/MFM-  Do you or your partner have a history of any of the following in yourselves or first degree relatives?  Cystic fibrosis no  Spinal muscular atrophy no  Hemoglobinopathy/Sickle Cell/Thalassemia no  Fragile X Intellectual Disability no    If yes, discuss Carrier Screening and recommend consultation with MFM/Genetic Counseling and place specific [facility] Referral for.    If no, discuss Carrier Screening being completed once in a lifetime as a standard of care lab test. Place orders for Cystic Fibrosis Gene Test ([id number]) and Spinal Muscular Atrophy DNA ([id number])      Appointment for Nuchal Translucency Ultrasound at [facility] scheduled for 2024      Interview education  [facility] Pregnancy Essentials Book reviewed, discussed and attached to their AVS YES    Nurse/Family Partnership- patient may qualify NO; referral placed NO    Prenatal lab work scripts YES  Extra labs ordered:  Iron Panel    Aspirin/Preeclampsia Screen    Risk Level Risk  Factor Recommendation   LOW Prior Uncomplicated full-term delivery YES No Aspirin recommendation        MODERATE Nulliparity no Recommend low-dose aspirin if     BMI>30 YES 2 or more moderate risk factors    Family History Preeclampsia (mother/sister) no     35yr old or greater no     Black Race, Concern for SDOH/Low Socioeconomic no     IVF Pregnancy  no     Personal History Risks (low birth weight, prior adverse preg outcome, >10yr preg interval) no         HIGH History of Preeclampsia no Recommend low-dose aspirin if     Multifetal gestation no 1 or more high risk factors    Chronic HTN no     Type 1 or 2 Diabetes no     Renal Disease no     Autoimmune Disease  no      Contraindications to ASA therapy:  NSAID/ ASA allergy: no  Nasal polyps: no  Asthma with history of ASA induced bronchospasm: no  Relative contraindications:  History of GI bleed: no  Active peptic ulcer disease: no  Severe hepatic dysfunction: no    Patient should be recommended to take ASA 162mg during this pregnancy from 12-36wks to lower her risk of preeclampsia: Reviewed benefits of ASA therapy with Claudette. She was not on therapy during her first pregnancy. Aware that she it is not recommended based upon the algorithm.      PN1 visit scheduled. The patient was oriented to our practice, the navigator role, reviewed delivering physicians and the [facility] for Delivery. All questions were answered.    Interviewed by: Leatha Moyer RN

## 2024-05-16 NOTE — PATIENT INSTRUCTIONS
Congratulations!! Please review our Pregnancy Essential Guide and [facility] L&D Virtual tour from our networks website.     [facility] Pregnancy Essentials Guide  [facility] ([email or web address])     [facility] ([employer])